# Patient Record
Sex: MALE | Race: BLACK OR AFRICAN AMERICAN | Employment: FULL TIME | URBAN - METROPOLITAN AREA
[De-identification: names, ages, dates, MRNs, and addresses within clinical notes are randomized per-mention and may not be internally consistent; named-entity substitution may affect disease eponyms.]

---

## 2022-03-19 PROBLEM — E29.1 HYPOGONADISM IN MALE: Status: ACTIVE | Noted: 2018-10-18

## 2022-04-01 PROBLEM — Z86.010 H/O ADENOMATOUS POLYP OF COLON: Status: ACTIVE | Noted: 2022-04-01

## 2022-04-01 PROBLEM — D12.6 TUBULOVILLOUS ADENOMA POLYP OF COLON: Status: ACTIVE | Noted: 2022-04-01

## 2022-04-01 PROBLEM — Z86.0101 H/O ADENOMATOUS POLYP OF COLON: Status: ACTIVE | Noted: 2022-04-01

## 2022-05-31 RX ORDER — LOSARTAN POTASSIUM 50 MG/1
TABLET ORAL
Qty: 90 TABLET | Refills: 1 | OUTPATIENT
Start: 2022-05-31

## 2022-06-27 ENCOUNTER — PREP FOR PROCEDURE (OUTPATIENT)
Dept: SURGERY | Age: 57
End: 2022-06-27

## 2022-06-28 RX ORDER — SODIUM CHLORIDE 0.9 % (FLUSH) 0.9 %
5-40 SYRINGE (ML) INJECTION EVERY 12 HOURS SCHEDULED
Status: CANCELLED | OUTPATIENT
Start: 2022-06-28

## 2022-06-28 RX ORDER — SODIUM CHLORIDE 9 MG/ML
INJECTION, SOLUTION INTRAVENOUS PRN
Status: CANCELLED | OUTPATIENT
Start: 2022-06-28

## 2022-06-28 RX ORDER — SODIUM CHLORIDE 0.9 % (FLUSH) 0.9 %
5-40 SYRINGE (ML) INJECTION PRN
Status: CANCELLED | OUTPATIENT
Start: 2022-06-28

## 2022-07-07 DIAGNOSIS — E29.1 HYPOGONADISM IN MALE: Primary | ICD-10-CM

## 2022-07-08 RX ORDER — TESTOSTERONE CYPIONATE 200 MG/ML
INJECTION INTRAMUSCULAR
Qty: 10 ML | Refills: 1 | Status: SHIPPED | OUTPATIENT
Start: 2022-07-08 | End: 2022-07-20 | Stop reason: SDUPTHER

## 2022-07-10 DIAGNOSIS — E29.1 HYPOGONADISM IN MALE: Primary | ICD-10-CM

## 2022-07-10 DIAGNOSIS — F41.1 GENERALIZED ANXIETY DISORDER: ICD-10-CM

## 2022-07-10 DIAGNOSIS — E78.00 PURE HYPERCHOLESTEROLEMIA: ICD-10-CM

## 2022-07-10 DIAGNOSIS — E55.9 VITAMIN D DEFICIENCY: ICD-10-CM

## 2022-07-11 ENCOUNTER — TELEPHONE (OUTPATIENT)
Dept: FAMILY MEDICINE CLINIC | Facility: CLINIC | Age: 57
End: 2022-07-11

## 2022-07-11 DIAGNOSIS — E29.1 HYPOGONADISM IN MALE: ICD-10-CM

## 2022-07-11 NOTE — TELEPHONE ENCOUNTER
testosterone cypionate (DEPOTESTOTERONE CYPIONATE) 200 mg/mL injection 10 mL 1 3/22/2022     Sig: INJECT 1 ML INTO THE MUSCLE EVERY 21 DAYS (MAX DAILY AMOUNT: 1 ML)    Sent to pharmacy as: testosterone cypionate 200 mg/mL intramuscular oil (DEPOTESTOTERONE CYPIONATE)    E-Prescribing Status: Receipt confirmed by pharmacy (3/22/2022 12:10 PM EDT)            Associated Diagnoses    Hypogonadism in male           Pharmacy    [facility] [address] [place], [address] [place] [address]       Encounter

## 2022-07-14 ENCOUNTER — NURSE ONLY (OUTPATIENT)
Dept: FAMILY MEDICINE CLINIC | Facility: CLINIC | Age: 57
End: 2022-07-14

## 2022-07-14 DIAGNOSIS — E55.9 VITAMIN D DEFICIENCY: ICD-10-CM

## 2022-07-14 DIAGNOSIS — F41.1 GENERALIZED ANXIETY DISORDER: ICD-10-CM

## 2022-07-14 DIAGNOSIS — E29.1 HYPOGONADISM IN MALE: ICD-10-CM

## 2022-07-14 DIAGNOSIS — E78.00 PURE HYPERCHOLESTEROLEMIA: ICD-10-CM

## 2022-07-14 LAB
25(OH)D3 SERPL-MCNC: 46.1 NG/ML (ref 30–100)
ALBUMIN SERPL-MCNC: 3.9 G/DL (ref 3.5–5)
ALBUMIN/GLOB SERPL: 1.3 {RATIO} (ref 1.2–3.5)
ALP SERPL-CCNC: 56 U/L (ref 50–136)
ALT SERPL-CCNC: 44 U/L (ref 12–65)
ANION GAP SERPL CALC-SCNC: 4 MMOL/L (ref 7–16)
AST SERPL-CCNC: 21 U/L (ref 15–37)
BILIRUB SERPL-MCNC: 0.5 MG/DL (ref 0.2–1.1)
BUN SERPL-MCNC: 17 MG/DL (ref 6–23)
CALCIUM SERPL-MCNC: 8.5 MG/DL (ref 8.3–10.4)
CHLORIDE SERPL-SCNC: 109 MMOL/L (ref 98–107)
CHOLEST SERPL-MCNC: 184 MG/DL
CO2 SERPL-SCNC: 27 MMOL/L (ref 21–32)
CREAT SERPL-MCNC: 1.1 MG/DL (ref 0.8–1.5)
GLOBULIN SER CALC-MCNC: 3.1 G/DL (ref 2.3–3.5)
GLUCOSE SERPL-MCNC: 80 MG/DL (ref 65–100)
HDLC SERPL-MCNC: 52 MG/DL (ref 40–60)
HDLC SERPL: 3.5 {RATIO}
LDLC SERPL CALC-MCNC: 119.6 MG/DL
POTASSIUM SERPL-SCNC: 4.2 MMOL/L (ref 3.5–5.1)
PROT SERPL-MCNC: 7 G/DL (ref 6.3–8.2)
PSA SERPL-MCNC: 3.1 NG/ML
SODIUM SERPL-SCNC: 140 MMOL/L (ref 138–145)
TESTOST FREE SERPL-MCNC: 6.4 PG/ML (ref 7.2–24)
TRIGL SERPL-MCNC: 62 MG/DL (ref 35–150)
TSH, 3RD GENERATION: 0.98 UIU/ML (ref 0.36–3.74)
VLDLC SERPL CALC-MCNC: 12.4 MG/DL (ref 6–23)

## 2022-07-20 ENCOUNTER — OFFICE VISIT (OUTPATIENT)
Dept: FAMILY MEDICINE CLINIC | Facility: CLINIC | Age: 57
End: 2022-07-20
Payer: COMMERCIAL

## 2022-07-20 VITALS
HEIGHT: 73 IN | DIASTOLIC BLOOD PRESSURE: 86 MMHG | HEART RATE: 87 BPM | OXYGEN SATURATION: 99 % | WEIGHT: 183 LBS | BODY MASS INDEX: 24.25 KG/M2 | SYSTOLIC BLOOD PRESSURE: 126 MMHG

## 2022-07-20 DIAGNOSIS — E55.9 VITAMIN D DEFICIENCY: ICD-10-CM

## 2022-07-20 DIAGNOSIS — E29.1 HYPOGONADISM IN MALE: ICD-10-CM

## 2022-07-20 DIAGNOSIS — Z00.00 LABORATORY EXAMINATION ORDERED AS PART OF A COMPLETE PHYSICAL EXAMINATION: ICD-10-CM

## 2022-07-20 DIAGNOSIS — Z11.59 SPECIAL SCREENING EXAMINATION FOR VIRAL DISEASE: ICD-10-CM

## 2022-07-20 DIAGNOSIS — I10 ESSENTIAL HYPERTENSION: Primary | ICD-10-CM

## 2022-07-20 LAB
BILIRUBIN, URINE, POC: NEGATIVE
BLOOD URINE, POC: NEGATIVE
GLUCOSE URINE, POC: NEGATIVE
KETONES, URINE, POC: ABNORMAL
LEUKOCYTE ESTERASE, URINE, POC: NEGATIVE
NITRITE, URINE, POC: NEGATIVE
PH, URINE, POC: 6 (ref 4.6–8)
PROTEIN,URINE, POC: NEGATIVE
SPECIFIC GRAVITY, URINE, POC: 1.03 (ref 1–1.03)
URINALYSIS CLARITY, POC: CLEAR
URINALYSIS COLOR, POC: YELLOW
UROBILINOGEN, POC: NORMAL

## 2022-07-20 PROCEDURE — 99214 OFFICE O/P EST MOD 30 MIN: CPT | Performed by: NURSE PRACTITIONER

## 2022-07-20 PROCEDURE — 81003 URINALYSIS AUTO W/O SCOPE: CPT | Performed by: NURSE PRACTITIONER

## 2022-07-20 RX ORDER — ERGOCALCIFEROL (VITAMIN D2) 1250 MCG
50000 CAPSULE ORAL
Qty: 8 CAPSULE | Refills: 3 | Status: SHIPPED | OUTPATIENT
Start: 2022-07-20

## 2022-07-20 RX ORDER — TESTOSTERONE CYPIONATE 200 MG/ML
INJECTION INTRAMUSCULAR
Qty: 10 ML | Refills: 1 | Status: SHIPPED | OUTPATIENT
Start: 2022-07-20 | End: 2023-01-16

## 2022-07-20 RX ORDER — LOSARTAN POTASSIUM 50 MG/1
50 TABLET ORAL DAILY
Qty: 90 TABLET | Refills: 3 | Status: SHIPPED | OUTPATIENT
Start: 2022-07-20

## 2022-07-20 RX ORDER — ATENOLOL 50 MG/1
50 TABLET ORAL DAILY
Qty: 90 TABLET | Refills: 3 | Status: SHIPPED | OUTPATIENT
Start: 2022-07-20

## 2022-07-20 ASSESSMENT — ENCOUNTER SYMPTOMS
STRIDOR: 0
SHORTNESS OF BREATH: 0
CHOKING: 0
WHEEZING: 0
VOICE CHANGE: 0
ANAL BLEEDING: 0
CHEST TIGHTNESS: 0
ABDOMINAL DISTENTION: 0
BACK PAIN: 0
RESPIRATORY NEGATIVE: 1
EYES NEGATIVE: 1
EYE REDNESS: 0
COLOR CHANGE: 0
SORE THROAT: 0
EYE ITCHING: 0
APNEA: 0
NAUSEA: 0
CONSTIPATION: 0
RHINORRHEA: 0
RECTAL PAIN: 0
ALLERGIC/IMMUNOLOGIC NEGATIVE: 1
COUGH: 0
GASTROINTESTINAL NEGATIVE: 1
SINUS PAIN: 0
SINUS PRESSURE: 0
FACIAL SWELLING: 0
EYE PAIN: 0
BLOOD IN STOOL: 0
VOMITING: 0
EYE DISCHARGE: 0
DIARRHEA: 0
TROUBLE SWALLOWING: 0
PHOTOPHOBIA: 0
ABDOMINAL PAIN: 0

## 2022-07-20 ASSESSMENT — PATIENT HEALTH QUESTIONNAIRE - PHQ9
10. IF YOU CHECKED OFF ANY PROBLEMS, HOW DIFFICULT HAVE THESE PROBLEMS MADE IT FOR YOU TO DO YOUR WORK, TAKE CARE OF THINGS AT HOME, OR GET ALONG WITH OTHER PEOPLE: 0
5. POOR APPETITE OR OVEREATING: 0
SUM OF ALL RESPONSES TO PHQ QUESTIONS 1-9: 0
1. LITTLE INTEREST OR PLEASURE IN DOING THINGS: 0
7. TROUBLE CONCENTRATING ON THINGS, SUCH AS READING THE NEWSPAPER OR WATCHING TELEVISION: 0
4. FEELING TIRED OR HAVING LITTLE ENERGY: 0
3. TROUBLE FALLING OR STAYING ASLEEP: 0
8. MOVING OR SPEAKING SO SLOWLY THAT OTHER PEOPLE COULD HAVE NOTICED. OR THE OPPOSITE, BEING SO FIGETY OR RESTLESS THAT YOU HAVE BEEN MOVING AROUND A LOT MORE THAN USUAL: 0
2. FEELING DOWN, DEPRESSED OR HOPELESS: 0
SUM OF ALL RESPONSES TO PHQ QUESTIONS 1-9: 0
SUM OF ALL RESPONSES TO PHQ QUESTIONS 1-9: 0
9. THOUGHTS THAT YOU WOULD BE BETTER OFF DEAD, OR OF HURTING YOURSELF: 0
6. FEELING BAD ABOUT YOURSELF - OR THAT YOU ARE A FAILURE OR HAVE LET YOURSELF OR YOUR FAMILY DOWN: 0
SUM OF ALL RESPONSES TO PHQ9 QUESTIONS 1 & 2: 0
SUM OF ALL RESPONSES TO PHQ QUESTIONS 1-9: 0

## 2022-07-20 ASSESSMENT — ANXIETY QUESTIONNAIRES
2. NOT BEING ABLE TO STOP OR CONTROL WORRYING: 0
GAD7 TOTAL SCORE: 1
5. BEING SO RESTLESS THAT IT IS HARD TO SIT STILL: 0
3. WORRYING TOO MUCH ABOUT DIFFERENT THINGS: 0
1. FEELING NERVOUS, ANXIOUS, OR ON EDGE: 0
IF YOU CHECKED OFF ANY PROBLEMS ON THIS QUESTIONNAIRE, HOW DIFFICULT HAVE THESE PROBLEMS MADE IT FOR YOU TO DO YOUR WORK, TAKE CARE OF THINGS AT HOME, OR GET ALONG WITH OTHER PEOPLE: NOT DIFFICULT AT ALL
7. FEELING AFRAID AS IF SOMETHING AWFUL MIGHT HAPPEN: 0
4. TROUBLE RELAXING: 1
6. BECOMING EASILY ANNOYED OR IRRITABLE: 0

## 2022-07-20 NOTE — PROGRESS NOTES
medications for this visit. Allergies   Allergen Reactions    Tramadol Other (See Comments)     Patient states not allergic    Hydrocodone-Acetaminophen Nausea And Vomiting     Patient Active Problem List   Diagnosis    Hypogonadism in male    H/O adenomatous polyp of colon    Tubulovillous adenoma polyp of colon     Past Medical History:   Diagnosis Date    Chronic pain 2014    back pain and right buttocks, leg , foot    HNP (herniated nucleus pulposus), lumbar     Hypertension      Past Surgical History:   Procedure Laterality Date    COLONOSCOPY N/A 10/13/2016    COLONOSCOPY/ 27 performed by Zaria Whitten MD at [facility] ENDOSCOPY    COLONOSCOPY,[place] ECHEVERRIA/CAUTERY  10/13/2016         LUMBAR LAMINECTOMY      x 2     Family History   Problem Relation Age of Onset    Cancer Mother     Cancer Sister     Cancer Father     Hypertension Mother      Social History     Tobacco Use    Smoking status: Former     Packs/day: 0.10     Types: Cigarettes     Quit date: 2004     Years since quittin.5    Smokeless tobacco: Never   Substance Use Topics    Alcohol use: Yes         REVIEW OF SYSTEM    Review of Systems   Constitutional: Negative. Negative for activity change, appetite change, chills, diaphoresis, fatigue, fever and unexpected weight change. HENT: Negative. Negative for congestion, dental problem, drooling, ear discharge, ear pain, facial swelling, hearing loss, mouth sores, nosebleeds, postnasal drip, rhinorrhea, sinus pressure, sinus pain, sneezing, sore throat, tinnitus, trouble swallowing and voice change. Eyes: Negative. Negative for photophobia, pain, discharge, redness, itching and visual disturbance. Respiratory: Negative. Negative for apnea, cough, choking, chest tightness, shortness of breath, wheezing and stridor. Cardiovascular: Negative. Negative for chest pain, palpitations and leg swelling. Gastrointestinal: Negative.   Negative for abdominal distention, abdominal pain, anal bleeding, blood in stool, constipation, diarrhea, nausea, rectal pain and vomiting. Endocrine: Negative. Negative for cold intolerance, heat intolerance, polydipsia, polyphagia and polyuria. Genitourinary: Negative. Negative for decreased urine volume, difficulty urinating, dysuria, enuresis, flank pain, frequency, genital sores, hematuria and urgency. Musculoskeletal: Negative. Negative for arthralgias, back pain, gait problem, joint swelling, myalgias, neck pain and neck stiffness. Skin: Negative. Negative for color change, pallor, rash and wound. Allergic/Immunologic: Negative. Negative for environmental allergies, food allergies and immunocompromised state. Neurological: Negative. Negative for dizziness, tremors, seizures, syncope, facial asymmetry, speech difficulty, weakness, light-headedness, numbness and headaches. Hematological: Negative. Negative for adenopathy. Does not bruise/bleed easily. Psychiatric/Behavioral: Negative. Negative for agitation, behavioral problems, confusion, decreased concentration, dysphoric mood, hallucinations, self-injury, sleep disturbance and suicidal ideas. The patient is not nervous/anxious and is not hyperactive. OBJECTIVE:  /86 (Site: Left Upper Arm, Position: Sitting)   Pulse 87   Ht 6' 1\" (1.854 m)   Wt 183 lb (83 kg)   SpO2 99%   BMI 24.14 kg/m²      Physical Exam  Constitutional:       General: He is not in acute distress. Appearance: Normal appearance. He is not ill-appearing, toxic-appearing or diaphoretic. HENT:      Head: Normocephalic and atraumatic. Right Ear: Tympanic membrane, ear canal and external ear normal. There is no impacted cerumen. Left Ear: Tympanic membrane, ear canal and external ear normal. There is no impacted cerumen. Nose: Nose normal.      Mouth/Throat:      Pharynx: Oropharynx is clear. Eyes:      Extraocular Movements: Extraocular movements intact.       Conjunctiva/sclera: Conjunctivae normal.      Pupils: Pupils are equal, round, and reactive to light. Neck:      Vascular: No carotid bruit. Cardiovascular:      Rate and Rhythm: Normal rate and regular rhythm. Pulses: Normal pulses. Heart sounds: Normal heart sounds. Pulmonary:      Effort: Pulmonary effort is normal.      Breath sounds: Normal breath sounds. Abdominal:      General: Abdomen is flat. Bowel sounds are normal.      Palpations: Abdomen is soft. Musculoskeletal:      Cervical back: Normal range of motion and neck supple. No rigidity or tenderness. Lymphadenopathy:      Cervical: No cervical adenopathy. Skin:     General: Skin is warm and dry. Neurological:      General: No focal deficit present. Mental Status: He is alert and oriented to person, place, and time. Psychiatric:         Mood and Affect: Mood normal.         Behavior: Behavior normal.         Thought Content: Thought content normal.         Judgment: Judgment normal.         Medical problems and test results were reviewed with the patient today. Lab Results   Component Value Date     07/14/2022    K 4.2 07/14/2022     (H) 07/14/2022    CO2 27 07/14/2022    BUN 17 07/14/2022    CREATININE 1.10 07/14/2022    GLUCOSE 80 07/14/2022    CALCIUM 8.5 07/14/2022    PROT 7.0 07/14/2022    LABALBU 3.9 07/14/2022    BILITOT 0.5 07/14/2022    ALKPHOS 56 07/14/2022    AST 21 07/14/2022    ALT 44 07/14/2022    LABGLOM >60 07/14/2022    GFRAA >60 07/14/2022    AGRATIO 1.8 12/08/2021    GLOB 3.1 07/14/2022           Lab Results   Component Value Date/Time    VITD25 46.1 07/14/2022 09:54 AM      Lab Results   Component Value Date    PSA 3.1 07/14/2022    PSA 2.7 12/08/2021    PSA 2.6 07/27/2021         ASSESSMENT and PLAN    1. Essential hypertension  -     AMB POC URINALYSIS DIP STICK AUTO W/O MICRO  -     atenolol (TENORMIN) 50 MG tablet; Take 1 tablet by mouth in the morning.  For blood pressure., Disp-90 tablet, R-3Normal  - losartan (COZAAR) 50 MG tablet; Take 1 tablet by mouth in the morning. For blood pressure., Disp-90 tablet, R-3Normal    Blood pressure is stable today of 126/86. We will have patient continue with atenolol 50 mg once daily along with losartan 50 mg once daily. We will have patient continue with current therapy. 2. Hypogonadism in male  -     testosterone cypionate (DEPOTESTOTERONE CYPIONATE) 200 MG/ML injection; INJECT 1ML INTRAMUSCULARLY EVERY 21 DAYS, Disp-10 mL, R-1Normal  -     PSA Screening; Future    Stable. Patient reports no sign or symptom of hematuria, urinary urgency or frequency or hesitancy. We will have patient continue current therapy. 3. Vitamin D deficiency  -     ergocalciferol (ERGOCALCIFEROL) 1.25 MG (52851 UT) capsule; Take 1 capsule by mouth every 14 days With food for vitamin D supplementation, Disp-8 capsule, R-3Normal  -     Vitamin D 25 Hydroxy; Future    Vitamin D level now normal.  We will decrease ergocalciferol to 50,000 international units once every 14 days for maintenance therapy. We will repeat level in 6 months. 4. Special screening examination for viral disease  -     Hepatitis C Antibody; Future  -     HIV 1/2 Ag/Ab, 4TH Generation,W Rflx Confirm; Future    Per current guideline recommendation for screening, testing ordered for next physical labs. 5. Laboratory examination ordered as part of a complete physical examination  -     CBC with Auto Differential; Future  -     Lipid Panel; Future  -     Comprehensive Metabolic Panel; Future  -     TSH; Future  -     PSA Screening;  Future    Orders Placed This Encounter   Procedures    CBC with Auto Differential     Standing Status:   Future     Standing Expiration Date:   1/20/2024    Lipid Panel     Standing Status:   Future     Standing Expiration Date:   7/20/2023    Comprehensive Metabolic Panel     Standing Status:   Future     Standing Expiration Date:   1/20/2024    Vitamin D 25 Hydroxy     Standing Status: Future     Standing Expiration Date:   7/20/2023    TSH     Standing Status:   Future     Standing Expiration Date:   7/20/2023    Hepatitis C Antibody     Standing Status:   Future     Standing Expiration Date:   7/20/2023    PSA Screening     Standing Status:   Future     Standing Expiration Date:   7/20/2023    HIV 1/2 Ag/Ab, 4TH Generation,W Rflx Confirm     Standing Status:   Future     Standing Expiration Date:   7/20/2023    AMB POC URINALYSIS DIP STICK AUTO W/O MICRO           Elements of this note have been dictated using speech recognition software. As a result, errors of speech recognition may have occurred. On this date 7/20/2022 I have spent 30 minutes reviewing previous notes, test results and face to face with the patient discussing the diagnosis and importance of compliance with the treatment plan as well as documenting on the day of the visit. Greater than 50% of this visit was spent counseling the patient about test results, prognosis, importance of compliance, education about disease process, benefits of medications, instructions for management of acute symptoms, and follow up plans. Return in about 6 months (around 1/20/2023) for Physical with fasting labs prior.      Simran Duarte, GILL - CNP

## 2022-07-26 NOTE — PERIOP NOTE
Patient verified name, , and procedure. Type: 1a; abbreviated assessment per anesthesia guidelines    Labs per anesthesia: none    Instructed pt that they will be notified the day before their procedure by the GI Lab for time of arrival if their procedure is [address] and Pre-op for [place] cases. Arrival times should be called by 5 pm. If no phone is received the patient should contact their respective hospital. The GI lab telephone number is [phone] and ES Pre-op is [phone]. Follow diet and prep instructions per office including NPO status. If patient has NOT received instructions from office patient is advised to call surgeon office, verbalizes understanding. Bath or shower the night before and the am of surgery with non-mositurizing soap. No lotions, oils, powders, cologne on skin. No make up, eye make up or jewelry. Wear loose fitting comfortable, clean clothing. Must have adult present in building the entire time . Medications for the day of procedure atenolol. May take hydroxyzine if needed. patient to hold Naproxen (NSAIDS), Vitamins and supplements. DO not take losartan on morning ofprocedure    The following discharge instructions reviewed with patient: medication given during procedure may cause drowsiness for several hours, therefore, do not drive or operate machinery for remainder of the day. You may not drink alcohol on the day of your procedure, please resume regular diet and activity unless otherwise directed. You may experience abdominal distention for several hours that is relieved by the passage of gas. Contact your physician if you have any of the following: fever or chills, severe abdominal pain or excessive amount of bleeding or a large amount when having a bowel movement.  Occasional specks of blood with bowel movement would not be unusual.

## 2022-07-27 NOTE — PROGRESS NOTES
Spoke with pt regarding tomorrow's procedure.  Pt verbalized understanding of early arrival time of 0645 as well as  policy

## 2022-07-28 ENCOUNTER — ANESTHESIA EVENT (OUTPATIENT)
Dept: ENDOSCOPY | Age: 57
End: 2022-07-28
Payer: COMMERCIAL

## 2022-07-28 ENCOUNTER — ANESTHESIA (OUTPATIENT)
Dept: ENDOSCOPY | Age: 57
End: 2022-07-28
Payer: COMMERCIAL

## 2022-07-28 ENCOUNTER — HOSPITAL ENCOUNTER (OUTPATIENT)
Age: 57
Setting detail: OUTPATIENT SURGERY
Discharge: HOME | End: 2022-07-28
Attending: SURGERY | Admitting: SURGERY
Payer: COMMERCIAL

## 2022-07-28 VITALS
BODY MASS INDEX: 25.06 KG/M2 | RESPIRATION RATE: 18 BRPM | OXYGEN SATURATION: 99 % | DIASTOLIC BLOOD PRESSURE: 82 MMHG | HEART RATE: 59 BPM | SYSTOLIC BLOOD PRESSURE: 138 MMHG | TEMPERATURE: 97.7 F | HEIGHT: 72 IN | WEIGHT: 185 LBS

## 2022-07-28 PROCEDURE — 2580000003 HC RX 258: Performed by: REGISTERED NURSE

## 2022-07-28 PROCEDURE — 3609027000 HC COLONOSCOPY: Performed by: SURGERY

## 2022-07-28 PROCEDURE — 3700000000 HC ANESTHESIA ATTENDED CARE: Performed by: SURGERY

## 2022-07-28 PROCEDURE — 7100000011 HC PHASE II RECOVERY - ADDTL 15 MIN: Performed by: SURGERY

## 2022-07-28 PROCEDURE — 3700000001 HC ADD 15 MINUTES (ANESTHESIA): Performed by: SURGERY

## 2022-07-28 PROCEDURE — 6360000002 HC RX W HCPCS: Performed by: REGISTERED NURSE

## 2022-07-28 PROCEDURE — 2500000003 HC RX 250 WO HCPCS: Performed by: REGISTERED NURSE

## 2022-07-28 PROCEDURE — 7100000010 HC PHASE II RECOVERY - FIRST 15 MIN: Performed by: SURGERY

## 2022-07-28 PROCEDURE — 45378 DIAGNOSTIC COLONOSCOPY: CPT | Performed by: SURGERY

## 2022-07-28 PROCEDURE — 2709999900 HC NON-CHARGEABLE SUPPLY: Performed by: SURGERY

## 2022-07-28 RX ORDER — SODIUM CHLORIDE, SODIUM LACTATE, POTASSIUM CHLORIDE, CALCIUM CHLORIDE 600; 310; 30; 20 MG/100ML; MG/100ML; MG/100ML; MG/100ML
INJECTION, SOLUTION INTRAVENOUS CONTINUOUS
Status: CANCELLED | OUTPATIENT
Start: 2022-07-28

## 2022-07-28 RX ORDER — SODIUM CHLORIDE 0.9 % (FLUSH) 0.9 %
5-40 SYRINGE (ML) INJECTION EVERY 12 HOURS SCHEDULED
Status: CANCELLED | OUTPATIENT
Start: 2022-07-28

## 2022-07-28 RX ORDER — LIDOCAINE HYDROCHLORIDE 10 MG/ML
1 INJECTION, SOLUTION INFILTRATION; PERINEURAL
Status: CANCELLED | OUTPATIENT
Start: 2022-07-28 | End: 2022-07-28

## 2022-07-28 RX ORDER — ONDANSETRON 2 MG/ML
4 INJECTION INTRAMUSCULAR; INTRAVENOUS
Status: CANCELLED | OUTPATIENT
Start: 2022-07-28 | End: 2022-07-28

## 2022-07-28 RX ORDER — SODIUM CHLORIDE, SODIUM LACTATE, POTASSIUM CHLORIDE, CALCIUM CHLORIDE 600; 310; 30; 20 MG/100ML; MG/100ML; MG/100ML; MG/100ML
INJECTION, SOLUTION INTRAVENOUS CONTINUOUS PRN
Status: DISCONTINUED | OUTPATIENT
Start: 2022-07-28 | End: 2022-07-28 | Stop reason: SDUPTHER

## 2022-07-28 RX ORDER — PROPOFOL 10 MG/ML
INJECTION, EMULSION INTRAVENOUS PRN
Status: DISCONTINUED | OUTPATIENT
Start: 2022-07-28 | End: 2022-07-28 | Stop reason: SDUPTHER

## 2022-07-28 RX ORDER — PROPOFOL 10 MG/ML
INJECTION, EMULSION INTRAVENOUS CONTINUOUS PRN
Status: DISCONTINUED | OUTPATIENT
Start: 2022-07-28 | End: 2022-07-28 | Stop reason: SDUPTHER

## 2022-07-28 RX ORDER — SODIUM CHLORIDE 0.9 % (FLUSH) 0.9 %
5-40 SYRINGE (ML) INJECTION PRN
Status: CANCELLED | OUTPATIENT
Start: 2022-07-28

## 2022-07-28 RX ORDER — LIDOCAINE HYDROCHLORIDE 20 MG/ML
INJECTION, SOLUTION EPIDURAL; INFILTRATION; INTRACAUDAL; PERINEURAL PRN
Status: DISCONTINUED | OUTPATIENT
Start: 2022-07-28 | End: 2022-07-28 | Stop reason: SDUPTHER

## 2022-07-28 RX ORDER — SODIUM CHLORIDE 9 MG/ML
INJECTION, SOLUTION INTRAVENOUS PRN
Status: CANCELLED | OUTPATIENT
Start: 2022-07-28

## 2022-07-28 RX ADMIN — PROPOFOL 200 MCG/KG/MIN: 10 INJECTION, EMULSION INTRAVENOUS at 08:44

## 2022-07-28 RX ADMIN — LIDOCAINE HYDROCHLORIDE 40 MG: 20 INJECTION, SOLUTION EPIDURAL; INFILTRATION; INTRACAUDAL; PERINEURAL at 08:42

## 2022-07-28 RX ADMIN — PROPOFOL 50 MG: 10 INJECTION, EMULSION INTRAVENOUS at 08:42

## 2022-07-28 RX ADMIN — PROPOFOL 30 MG: 10 INJECTION, EMULSION INTRAVENOUS at 08:44

## 2022-07-28 RX ADMIN — SODIUM CHLORIDE, SODIUM LACTATE, POTASSIUM CHLORIDE, AND CALCIUM CHLORIDE: 600; 310; 30; 20 INJECTION, SOLUTION INTRAVENOUS at 08:38

## 2022-07-28 RX ADMIN — PROPOFOL 40 MG: 10 INJECTION, EMULSION INTRAVENOUS at 08:50

## 2022-07-28 ASSESSMENT — PAIN - FUNCTIONAL ASSESSMENT: PAIN_FUNCTIONAL_ASSESSMENT: NONE - DENIES PAIN

## 2022-07-28 NOTE — OP NOTE
Operative Note      Patient: Dickey Schlatter  YOB: 1965  MRN: [id number]    Colonoscopy Procedure Note  Date of Procedure: 7/28/2022    Pre-Op Diagnosis: h/o TVA of R colon 2016    Post-Op Diagnosis: Same and normal colonoscopy to terminal ileum       Procedure(s):  COLORECTAL CANCER SCREENING, HIGH RISK    Surgeon(s):  Latonya Russell MD    Assistant:   * No surgical staff found *    Anesthesia: Monitor Anesthesia Care    Estimated Blood Loss (mL): Minimal    Complications: None    Specimens:   * No specimens in log *    Detailed Description of Procedure:   Informed consent was obtained for the procedure, including sedation. Risks of perforation, hemorrhage, adverse drug reaction and aspiration were discussed. The patient was placed in the left lateral decubitus position. Based on the pre-procedure assessment, including review of the patient's medical history, medications, allergies, and review of systems, he had been deemed to be an appropriate candidate for sedation by the Anesthesia Dept. The patient was monitored continuously with ECG tracing, pulse oximetry, blood pressure monitoring, and direct observations. A time out was performed. Once sedation was adequate, a rectal examination was performed. The [id number] was inserted into the rectum and advanced under direct vision to the terminal ileum. The quality of the colonic preparation was good. A careful inspection was made as the colonoscope was withdrawn, including a retroflexed view of the rectum; findings and interventions are described below. Appropriate photodocumentation was obtained. Findings: ANUS: Anal exam reveals no masses or hemorrhoids, sphincter tone is normal.   RECTUM: Rectal exam reveals no masses or hemorrhoids. SIGMOID COLON: The mucosa is normal with good vascular pattern and without ulcers, diverticula, and polyps.    DESCENDING COLON: The mucosa is normal with good vascular pattern and without ulcers, diverticula, and polyps. TRANSVERSE COLON: The mucosa is normal with good vascular pattern and without ulcers, diverticula, and polyps. ASCENDING COLON: The mucosa is normal with good vascular pattern and without ulcers, diverticula, and polyps. CECUM: The appendiceal orifice appears normal. The ileocecal valve appears normal.   TERMINAL ILEUM: The terminal ileum was normal.            Attending Attestation: I performed the procedure. Impression:  Normal colonoscopy to the terminal ileum, with no evidence of neoplasia, diverticular disease, or mucosal abnormality.     Recommendations:-Follow-up with primary care physician on normal schedule and -Repeat colonoscopy in 5 years(2027) due to h/o TVA polyp 2016    Electronically signed by Latha Sow MD on 7/28/2022 at 9:41 AM

## 2022-07-28 NOTE — DISCHARGE INSTRUCTIONS
Gastrointestinal Colonoscopy/Flexible Sigmoidoscopy - Lower Exam Discharge Instructions    Call Dr. Deonte Mendoza at [phone] for any problems or questions. Contact the doctors office for follow up appointment as directed. Medication may cause drowsiness for several hours, therefore:  Do not drive or operate machinery for reminder of the day. No alcohol today. Do not make any important or legal decisions for 24 hours. Do not sign any legal documents for 24 hours. Ordinarily, you may resume regular diet and activity after exam unless otherwise specified by your physician. Because of air put into your colon during exam, you may experience some abdominal distension, relieved by the passage of gas, for several hours. Contact your physician if you have any of the following:  Excessive amount of bleeding - large amount when having a bowel movement. Occasional specks of blood with bowel movement would not be unusual.  Severe abdominal pain  Fever or Chills    Polyp Removal - follow these additional instructions   Take Metamucil - 1 tablespoon in juice every morning for 3 days, as needed for constipation.       Any additional instructions:    Office will call to make any future appointments

## 2022-07-28 NOTE — H&P
[facility]  [address] [place] [address]  Kelsea Ruiz, [address] [facility]  [phone]    H&P Note   Sirena Benitez   MRN: [id number]     : 1965        HPI: Sirena Benitez is a 60 y.o. male who is referred by Chung King for colonoscopy. I performed his initial colonoscopy in  and removed a tubulovillous adenoma from the ascending colon. A 3-year surveillance interval would have been recommended, but he did not keep his follow-up appointment. He was set up for a 5-year interval recommendation. He is slightly off schedule, he has had no GI complaints. In 2016, he was referred by Dr. Sharol Lombard for initial screening colonoscopy. He has no family history of colon cancer or other colon diseases. He has normal daily bowel movements. He denies seeing any blood or mucus per rectum or any change in the caliber of stools. He has no past surgical history of abdominal surgery. He has had back surgery and had no issues with anesthesia. Past Medical History:   Diagnosis Date    Chronic pain 2014    back pain and right buttocks, leg , foot    HNP (herniated nucleus pulposus), lumbar     Hypertension      Past Surgical History:   Procedure Laterality Date    COLONOSCOPY N/A 10/13/2016    COLONOSCOPY/  performed by Matilde Diaz MD at [facility] ENDOSCOPY    COLONOSCOPY,[place] ECHEVERRIA/CAUTERY  10/13/2016         HX LUMBAR LAMINECTOMY      x 2     Current Outpatient Medications   Medication Sig    testosterone cypionate (DEPOTESTOTERONE CYPIONATE) 200 mg/mL injection INJECT 1 ML INTO THE MUSCLE EVERY 21 DAYS (MAX DAILY AMOUNT: 1 ML)    atenoloL (TENORMIN) 50 mg tablet Take 1 Tablet by mouth daily.  For blood pressure    cyclobenzaprine (FLEXERIL) 10 mg tablet TAKE ONE-HALF TO ONE TABLET BY MOUTH EVERY EVENING AS NEEDED FOR MUSCLE SPASM    hydrOXYzine HCL (ATARAX) 25 mg tablet Take 1/2 to 1 tablet orally up to twice daily as needed for anxiety    losartan (COZAAR) 50 mg tablet Take 1 Tablet by mouth daily. For blood pressure    ergocalciferol (ERGOCALCIFEROL) 1,250 mcg (50,000 unit) capsule Take 1 Capsule by mouth every seven (7) days. With food for vitamin D replacement therapy    sildenafil citrate (Viagra) 100 mg tablet Take 1 Tablet by mouth as needed for Other (ed). Syringe with Needle, Disp, 3 mL 23 gauge x 1 1/2\" syrg USE TO INJECT TESTOSTERONE EVERY 2 WEEKS    Needle, Disp, 18 G 18 gauge x 1 1/2\" ndle USE TO DRAW TESTOSTERONE EVERY 2 WEEKS    naproxen sodium (ALEVE) 220 mg cap Take  by mouth. No current facility-administered medications for this visit. ALLERGIES:  Lortab [hydrocodone-acetaminophen] and Tramadol    Social History     Socioeconomic History    Marital status:    Tobacco Use    Smoking status: Former Smoker     Packs/day: 0.10     Years: 5.00     Pack years: 0.50     Quit date: 2004     Years since quittin.2    Smokeless tobacco: Never Used   Substance and Sexual Activity    Alcohol use: Yes     Comment: occassionally    Drug use: No     Social History     Tobacco Use   Smoking Status Former Smoker    Packs/day: 0.10    Years: 5.00    Pack years: 0.50    Quit date: 2004    Years since quittin.2   Smokeless Tobacco Never Used     Family History   Problem Relation Age of Onset    Cancer Mother     Hypertension Mother     Cancer Father     Cancer Sister        ROS: The patient has no difficulty with chest pain or shortness of breath. No fever or chills. The patient denies any personal or family history of abnormal clotting or bleeding. Comprehensive review of systems was otherwise unremarkable except as noted above. Physical Exam:   Constitutional: Alert oriented cooperative patient in no acute distress. Visit Vitals  BP (!) 160/91   Pulse 82   Temp 98.5 °F (36.9 °C)   Ht 6' 1\" (1.854 m)   Wt 186 lb 3.2 oz (84.5 kg)   SpO2 99%   BMI 24.57 kg/m²     Eyes:Sclera are clear without icterus. ENMT: no obvious neck masses, no ear or lip lesions  CV: RRR. Normal perfusion  Resp: No JVD. Breathing is  non-labored. GI: soft and non-distended     Musculoskeletal: unremarkable with normal function. Neuro:  No obvious focal deficits  Psychiatric: normal affect and mood, no memory impairment    Lab Results   Component Value Date/Time    HGB 15.5 02/09/2015 12:53 PM       Lab Results   Component Value Date/Time    Sodium 141 12/08/2021 08:57 AM    Potassium 4.1 12/08/2021 08:57 AM    Chloride 104 12/08/2021 08:57 AM    CO2 25 12/08/2021 08:57 AM    BUN 21 12/08/2021 08:57 AM    Creatinine 1.29 (H) 12/08/2021 08:57 AM    Glucose 86 12/08/2021 08:57 AM    Bilirubin, total 0.2 12/08/2021 08:57 AM    Alk. phosphatase 66 12/08/2021 08:57 AM       Assessment/Plan:     Jyothi Angulo is a 60 y.o. male who presents for his interval surveillance colonoscopy. He had a tubulovillous adenoma of the ascending colon removed at his initial colonoscopy in October 2016. He unfortunately did not keep his follow-up appointment where he would have been set up for 3-year surveillance interval.  He was classified as a 5-year surveillance and is not far off from that date. He did excellent with a 1 day prep last time. He is appropriate for the adult colonoscope. We discussed proceeding with colonoscopy. I discussed the patient's condition and treatment options with the patient. I discussed risks of colonoscopy in language the patient could understand including bleeding, infection, aspiration, perforation, medication reaction, need for further endoscopy or surgery, abscess, fistula, SBO, DVT, PE, heart attack, stroke, renal failure, respiratory failure, ventilatory dependence, and death. The patient voiced understanding of all this and all questions were answered. Alternatives to colonoscopy were discussed also and risks of the alternatives. The patient requested that we proceed with colonoscopy. Informed consent was obtained.      Problem List  Date Reviewed: 4/1/2022            Codes Class Noted    H/O adenomatous polyp of colon ICD-10-CM: Z86.010  ICD-9-CM: V12.72  4/1/2022        Hypogonadism in male ICD-10-CM: E29.1  ICD-9-CM: 257.2  10/18/2018        Tubulovillous adenoma polyp of colon ICD-10-CM: D12.6  ICD-9-CM: 211.3  10/13/2016                    Del Chris MD,  FACS

## 2022-07-28 NOTE — ANESTHESIA PRE PROCEDURE
Department of Anesthesiology  Preprocedure Note       Name:  Rojelio Erickson   Age:  59 y.o.  :  1965                                          MRN:  [id number]         Date:  2022      Surgeon: Roya Ken):  Cherie Jacob MD    Procedure: Procedure(s):  COLORECTAL CANCER SCREENING, NOT HIGH RISK **recovery bay 4**    Medications prior to admission:   Prior to Admission medications    Medication Sig Start Date End Date Taking? Authorizing Provider   testosterone cypionate (DEPOTESTOTERONE CYPIONATE) 200 MG/ML injection INJECT 1ML INTRAMUSCULARLY EVERY 21 DAYS 22  GILL Morrow - CNP   atenolol (TENORMIN) 50 MG tablet Take 1 tablet by mouth in the morning. For blood pressure. 22   GILL Resendez - CNP   losartan (COZAAR) 50 MG tablet Take 1 tablet by mouth in the morning. For blood pressure. 22   GILL Resendez - CNP   ergocalciferol (ERGOCALCIFEROL) 1.25 MG (30422 UT) capsule Take 1 capsule by mouth every 14 days With food for vitamin D supplementation 22   GILL Morrow CNP   cyclobenzaprine (FLEXERIL) 10 MG tablet TAKE ONE-HALF TO ONE TABLET BY MOUTH EVERY EVENING AS NEEDED FOR MUSCLE SPASM 22   Ar Automatic Reconciliation   hydrOXYzine (ATARAX) 25 MG tablet Take 1/2 to 1 tablet orally up to twice daily as needed for anxiety 22   Ar Automatic Reconciliation   Naproxen Sodium 220 MG CAPS Take by mouth    Ar Automatic Reconciliation   sildenafil (VIAGRA) 100 MG tablet Take 100 mg by mouth as needed 8/3/21   Ar Automatic Reconciliation       Current medications:    No current facility-administered medications for this encounter. Allergies:     Allergies   Allergen Reactions    Tramadol Other (See Comments)     Patient states not allergic    Hydrocodone-Acetaminophen Nausea And Vomiting       Problem List:    Patient Active Problem List   Diagnosis Code    Hypogonadism in male E29.1    H/O adenomatous polyp of colon Z86.010    Tubulovillous adenoma polyp of colon D12.6       Past Medical History:        Diagnosis Date    Chronic pain 2014    back pain and right buttocks, leg , foot    HNP (herniated nucleus pulposus), lumbar     Hypertension        Past Surgical History:        Procedure Laterality Date    COLONOSCOPY N/A 10/13/2016    COLONOSCOPY/  performed by Saadia Loo MD at [facility],FORCEP/CAUTERY  10/13/2016         LUMBAR LAMINECTOMY      x 2       Social History:    Social History     Tobacco Use    Smoking status: Former     Packs/day: 0.10     Types: Cigarettes     Quit date: 2004     Years since quittin.5    Smokeless tobacco: Never   Substance Use Topics    Alcohol use: Not Currently                                Counseling given: Not Answered      Vital Signs (Current):   Vitals:    22 1112 22 0715   BP:  (!) 185/83   Pulse:  57   Resp:  18   Temp:  97.5 °F (36.4 °C)   TempSrc:  Oral   SpO2:  99%   Weight: 185 lb (83.9 kg) 185 lb (83.9 kg)   Height: 6' (1.829 m) 6' (1.829 m)                                              BP Readings from Last 3 Encounters:   22 (!) 185/83   22 126/86   22 138/78       NPO Status: Time of last liquid consumption:                         Time of last solid consumption:                         Date of last liquid consumption: 22                        Date of last solid food consumption: 22    BMI:   Wt Readings from Last 3 Encounters:   22 185 lb (83.9 kg)   22 183 lb (83 kg)   22 186 lb 12.8 oz (84.7 kg)     Body mass index is 25.09 kg/m².     CBC:   Lab Results   Component Value Date/Time    MCV 97.0 2021 09:00 AM       CMP:   Lab Results   Component Value Date/Time     2022 09:54 AM    K 4.2 2022 09:54 AM     2022 09:54 AM    CO2 27 2022 09:54 AM    BUN 17 2022 09:54 AM    CREATININE 1.10 2022 09:54 AM    GFRAA >60 07/14/2022 09:54 AM    AGRATIO 1.8 12/08/2021 08:57 AM    LABGLOM >60 07/14/2022 09:54 AM    GLUCOSE 80 07/14/2022 09:54 AM    PROT 7.0 07/14/2022 09:54 AM    CALCIUM 8.5 07/14/2022 09:54 AM    BILITOT 0.5 07/14/2022 09:54 AM    ALKPHOS 56 07/14/2022 09:54 AM    ALKPHOS 66 12/08/2021 08:57 AM    AST 21 07/14/2022 09:54 AM    ALT 44 07/14/2022 09:54 AM       POC Tests: No results for input(s): POCGLU, POCNA, POCK, POCCL, POCBUN, POCHEMO, POCHCT in the last 72 hours. Coags: No results found for: PROTIME, INR, APTT    HCG (If Applicable): No results found for: PREGTESTUR, PREGSERUM, HCG, HCGQUANT     ABGs: No results found for: PHART, PO2ART, ECH6LYK, SNV9CWL, BEART, R5KRQSWC     Type & Screen (If Applicable):  No results found for: LABABO, LABRH    Drug/Infectious Status (If Applicable):  No results found for: HIV, HEPCAB    COVID-19 Screening (If Applicable): No results found for: COVID19        Anesthesia Evaluation  Patient summary reviewed and Nursing notes reviewed no history of anesthetic complications:   Airway: Mallampati: I  TM distance: >3 FB   Neck ROM: full     Dental: normal exam         Pulmonary:Negative Pulmonary ROS breath sounds clear to auscultation                             Cardiovascular:  Exercise tolerance: good (>4 METS),   (+) hypertension:,         Rhythm: regular  Rate: normal                    Neuro/Psych:   Negative Neuro/Psych ROS              GI/Hepatic/Renal: Neg GI/Hepatic/Renal ROS           ROS comment: polyps. Endo/Other:                      ROS comment: polyp Abdominal:             Vascular: Other Findings:           Anesthesia Plan      TIVA     ASA 3       Induction: intravenous. Anesthetic plan and risks discussed with patient.                         Kal Villanueva MD   7/28/2022

## 2022-07-28 NOTE — INTERVAL H&P NOTE
Update History & Physical    The patient's History and Physical of July 28, 2022 was reviewed with the patient and I examined the patient. There was no change. The surgical site was confirmed by the patient and me. Plan: The risks, benefits, expected outcome, and alternative to the recommended procedure have been discussed with the patient. Patient understands and wants to proceed with the procedure.      Electronically signed by Marta Benavidez MD on 7/28/2022 at 8:02 AM

## 2022-07-28 NOTE — ANESTHESIA POSTPROCEDURE EVALUATION
Department of Anesthesiology  Postprocedure Note    Patient: Nakita Clemons  MRN: [id number]  YOB: 1965  Date of evaluation: 7/28/2022      Procedure Summary     Date: 07/28/22 Room / Location: [facility] ENDO 04 / [facility] ENDOSCOPY    Anesthesia Start: 0838 Anesthesia Stop: 7514    Procedure: COLORECTAL CANCER SCREENING, NOT HIGH RISK (Lower GI Region) Diagnosis:       Special screening for malignant neoplasms, colon      ()    Surgeons: Roselia Dueñas MD Responsible Provider: Rene Garcia MD    Anesthesia Type: TIVA ASA Status: 3          Anesthesia Type: TIVA    Ana Phase I: Ana Score: 10    Ana Phase II: Ana Score: 10      Anesthesia Post Evaluation    Patient location during evaluation: PACU  Patient participation: complete - patient participated  Level of consciousness: awake and alert  Airway patency: patent  Nausea & Vomiting: no nausea  Cardiovascular status: hemodynamically stable  Respiratory status: acceptable  Hydration status: euvolemic

## 2022-08-17 RX ORDER — SILDENAFIL 100 MG/1
TABLET, FILM COATED ORAL
Qty: 30 TABLET | Refills: 11 | Status: SHIPPED | OUTPATIENT
Start: 2022-08-17

## 2023-01-22 DIAGNOSIS — E29.1 HYPOGONADISM IN MALE: Primary | ICD-10-CM

## 2023-01-31 ENCOUNTER — NURSE ONLY (OUTPATIENT)
Dept: FAMILY MEDICINE CLINIC | Facility: CLINIC | Age: 58
End: 2023-01-31

## 2023-01-31 DIAGNOSIS — Z11.59 SPECIAL SCREENING EXAMINATION FOR VIRAL DISEASE: ICD-10-CM

## 2023-01-31 DIAGNOSIS — E55.9 VITAMIN D DEFICIENCY: ICD-10-CM

## 2023-01-31 DIAGNOSIS — E29.1 HYPOGONADISM IN MALE: ICD-10-CM

## 2023-01-31 DIAGNOSIS — Z00.00 LABORATORY EXAMINATION ORDERED AS PART OF A COMPLETE PHYSICAL EXAMINATION: ICD-10-CM

## 2023-01-31 LAB
ALBUMIN SERPL-MCNC: 3.7 G/DL (ref 3.5–5)
ALBUMIN/GLOB SERPL: 1.1 (ref 0.4–1.6)
ALP SERPL-CCNC: 66 U/L (ref 50–136)
ALT SERPL-CCNC: 51 U/L (ref 12–65)
ANION GAP SERPL CALC-SCNC: 8 MMOL/L (ref 2–11)
AST SERPL-CCNC: 24 U/L (ref 15–37)
BASOPHILS # BLD: 0 K/UL (ref 0–0.2)
BASOPHILS NFR BLD: 0 % (ref 0–2)
BILIRUB SERPL-MCNC: 0.8 MG/DL (ref 0.2–1.1)
BUN SERPL-MCNC: 15 MG/DL (ref 6–23)
CALCIUM SERPL-MCNC: 8.6 MG/DL (ref 8.3–10.4)
CHLORIDE SERPL-SCNC: 105 MMOL/L (ref 101–110)
CHOLEST SERPL-MCNC: 198 MG/DL
CO2 SERPL-SCNC: 27 MMOL/L (ref 21–32)
CREAT SERPL-MCNC: 1.1 MG/DL (ref 0.8–1.5)
DIFFERENTIAL METHOD BLD: NORMAL
EOSINOPHIL # BLD: 0.1 K/UL (ref 0–0.8)
EOSINOPHIL NFR BLD: 1 % (ref 0.5–7.8)
ERYTHROCYTE [DISTWIDTH] IN BLOOD BY AUTOMATED COUNT: 13.5 % (ref 11.9–14.6)
GLOBULIN SER CALC-MCNC: 3.4 G/DL (ref 2.8–4.5)
GLUCOSE SERPL-MCNC: 90 MG/DL (ref 65–100)
HCT VFR BLD AUTO: 46.1 % (ref 41.1–50.3)
HDLC SERPL-MCNC: 56 MG/DL (ref 40–60)
HDLC SERPL: 3.5
HGB BLD-MCNC: 15.3 G/DL (ref 13.6–17.2)
HIV 1+2 AB+HIV1 P24 AG SERPL QL IA: NONREACTIVE
HIV 1/2 RESULT COMMENT: NORMAL
IMM GRANULOCYTES # BLD AUTO: 0 K/UL (ref 0–0.5)
IMM GRANULOCYTES NFR BLD AUTO: 0 % (ref 0–5)
LDLC SERPL CALC-MCNC: 130.8 MG/DL
LYMPHOCYTES # BLD: 1.9 K/UL (ref 0.5–4.6)
LYMPHOCYTES NFR BLD: 34 % (ref 13–44)
MCH RBC QN AUTO: 31.3 PG (ref 26.1–32.9)
MCHC RBC AUTO-ENTMCNC: 33.2 G/DL (ref 31.4–35)
MCV RBC AUTO: 94.3 FL (ref 82–102)
MONOCYTES # BLD: 0.5 K/UL (ref 0.1–1.3)
MONOCYTES NFR BLD: 10 % (ref 4–12)
NEUTS SEG # BLD: 3 K/UL (ref 1.7–8.2)
NEUTS SEG NFR BLD: 55 % (ref 43–78)
NRBC # BLD: 0 K/UL (ref 0–0.2)
PLATELET # BLD AUTO: 220 K/UL (ref 150–450)
PMV BLD AUTO: 9.6 FL (ref 9.4–12.3)
POTASSIUM SERPL-SCNC: 4 MMOL/L (ref 3.5–5.1)
PROT SERPL-MCNC: 7.1 G/DL (ref 6.3–8.2)
PSA SERPL-MCNC: 3.9 NG/ML
RBC # BLD AUTO: 4.89 M/UL (ref 4.23–5.6)
SODIUM SERPL-SCNC: 140 MMOL/L (ref 133–143)
TRIGL SERPL-MCNC: 56 MG/DL (ref 35–150)
TSH, 3RD GENERATION: 1.32 UIU/ML (ref 0.36–3.74)
VLDLC SERPL CALC-MCNC: 11.2 MG/DL (ref 6–23)
WBC # BLD AUTO: 5.6 K/UL (ref 4.3–11.1)

## 2023-02-01 LAB
25(OH)D3 SERPL-MCNC: 40 NG/ML (ref 30–100)
HCV AB SER QL: NONREACTIVE

## 2023-02-04 LAB
TESTOST FREE SERPL-MCNC: 7.7 PG/ML (ref 7.2–24)
TESTOST SERPL-MCNC: 427 NG/DL (ref 264–916)

## 2023-03-02 ENCOUNTER — OFFICE VISIT (OUTPATIENT)
Dept: FAMILY MEDICINE CLINIC | Facility: CLINIC | Age: 58
End: 2023-03-02
Payer: COMMERCIAL

## 2023-03-02 VITALS
WEIGHT: 180 LBS | SYSTOLIC BLOOD PRESSURE: 134 MMHG | HEIGHT: 72 IN | HEART RATE: 69 BPM | BODY MASS INDEX: 24.38 KG/M2 | DIASTOLIC BLOOD PRESSURE: 76 MMHG | OXYGEN SATURATION: 99 %

## 2023-03-02 DIAGNOSIS — E55.9 VITAMIN D DEFICIENCY: ICD-10-CM

## 2023-03-02 DIAGNOSIS — E29.1 HYPOGONADISM IN MALE: ICD-10-CM

## 2023-03-02 DIAGNOSIS — Z00.00 ROUTINE GENERAL MEDICAL EXAMINATION AT A HEALTH CARE FACILITY: Primary | ICD-10-CM

## 2023-03-02 DIAGNOSIS — E78.00 PURE HYPERCHOLESTEROLEMIA: ICD-10-CM

## 2023-03-02 DIAGNOSIS — I10 ESSENTIAL HYPERTENSION: ICD-10-CM

## 2023-03-02 PROCEDURE — 3078F DIAST BP <80 MM HG: CPT | Performed by: NURSE PRACTITIONER

## 2023-03-02 PROCEDURE — 3075F SYST BP GE 130 - 139MM HG: CPT | Performed by: NURSE PRACTITIONER

## 2023-03-02 PROCEDURE — 99396 PREV VISIT EST AGE 40-64: CPT | Performed by: NURSE PRACTITIONER

## 2023-03-02 RX ORDER — CYCLOBENZAPRINE HCL 10 MG
TABLET ORAL
Qty: 30 TABLET | Refills: 5 | Status: SHIPPED | OUTPATIENT
Start: 2023-03-02

## 2023-03-02 RX ORDER — ATENOLOL 50 MG/1
50 TABLET ORAL DAILY
Qty: 90 TABLET | Refills: 3 | Status: SHIPPED | OUTPATIENT
Start: 2023-03-02

## 2023-03-02 RX ORDER — TESTOSTERONE CYPIONATE 200 MG/ML
INJECTION INTRAMUSCULAR
Qty: 10 ML | Refills: 1 | Status: SHIPPED | OUTPATIENT
Start: 2023-03-02 | End: 2023-08-29

## 2023-03-02 RX ORDER — SILDENAFIL 100 MG/1
TABLET, FILM COATED ORAL
Qty: 30 TABLET | Refills: 11 | Status: SHIPPED | OUTPATIENT
Start: 2023-03-02

## 2023-03-02 RX ORDER — ERGOCALCIFEROL 1.25 MG/1
50000 CAPSULE ORAL
Qty: 8 CAPSULE | Refills: 3 | Status: SHIPPED | OUTPATIENT
Start: 2023-03-02

## 2023-03-02 RX ORDER — LOSARTAN POTASSIUM 50 MG/1
50 TABLET ORAL DAILY
Qty: 90 TABLET | Refills: 3 | Status: SHIPPED | OUTPATIENT
Start: 2023-03-02

## 2023-03-02 ASSESSMENT — PATIENT HEALTH QUESTIONNAIRE - PHQ9
1. LITTLE INTEREST OR PLEASURE IN DOING THINGS: 0
SUM OF ALL RESPONSES TO PHQ QUESTIONS 1-9: 0
SUM OF ALL RESPONSES TO PHQ9 QUESTIONS 1 & 2: 0
SUM OF ALL RESPONSES TO PHQ QUESTIONS 1-9: 0
SUM OF ALL RESPONSES TO PHQ9 QUESTIONS 1 & 2: 0
SUM OF ALL RESPONSES TO PHQ QUESTIONS 1-9: 0
2. FEELING DOWN, DEPRESSED OR HOPELESS: 0
2. FEELING DOWN, DEPRESSED OR HOPELESS: 0
SUM OF ALL RESPONSES TO PHQ QUESTIONS 1-9: 0
1. LITTLE INTEREST OR PLEASURE IN DOING THINGS: 0
SUM OF ALL RESPONSES TO PHQ QUESTIONS 1-9: 0
SUM OF ALL RESPONSES TO PHQ QUESTIONS 1-9: 0

## 2023-03-02 ASSESSMENT — ENCOUNTER SYMPTOMS
EYE ITCHING: 0
COLOR CHANGE: 0
ALLERGIC/IMMUNOLOGIC NEGATIVE: 1
VOMITING: 0
EYE DISCHARGE: 0
SINUS PAIN: 0
ANAL BLEEDING: 0
DIARRHEA: 0
EYE REDNESS: 0
ABDOMINAL DISTENTION: 0
ABDOMINAL PAIN: 0
RECTAL PAIN: 0
CONSTIPATION: 0
GASTROINTESTINAL NEGATIVE: 1
BLOOD IN STOOL: 0
PHOTOPHOBIA: 0
SORE THROAT: 0
SINUS PRESSURE: 0
STRIDOR: 0
SHORTNESS OF BREATH: 0
EYE PAIN: 0
NAUSEA: 0
EYES NEGATIVE: 1
WHEEZING: 0
FACIAL SWELLING: 0
APNEA: 0
RESPIRATORY NEGATIVE: 1
VOICE CHANGE: 0
TROUBLE SWALLOWING: 0
BACK PAIN: 0
CHEST TIGHTNESS: 0
CHOKING: 0
RHINORRHEA: 0
COUGH: 0

## 2023-03-02 ASSESSMENT — ANXIETY QUESTIONNAIRES
7. FEELING AFRAID AS IF SOMETHING AWFUL MIGHT HAPPEN: 0
1. FEELING NERVOUS, ANXIOUS, OR ON EDGE: 0
5. BEING SO RESTLESS THAT IT IS HARD TO SIT STILL: 0
2. NOT BEING ABLE TO STOP OR CONTROL WORRYING: 0
4. TROUBLE RELAXING: 0
GAD7 TOTAL SCORE: 0
6. BECOMING EASILY ANNOYED OR IRRITABLE: 0
3. WORRYING TOO MUCH ABOUT DIFFERENT THINGS: 0
IF YOU CHECKED OFF ANY PROBLEMS ON THIS QUESTIONNAIRE, HOW DIFFICULT HAVE THESE PROBLEMS MADE IT FOR YOU TO DO YOUR WORK, TAKE CARE OF THINGS AT HOME, OR GET ALONG WITH OTHER PEOPLE: NOT DIFFICULT AT ALL

## 2023-03-02 NOTE — PROGRESS NOTES
PROGRESS NOTE    Chief Complaint   Patient presents with    Annual Exam     Presenting for CPX and to discuss labs, HTN and testicular hypofunction. SUBJECTIVE:     Arthur Huitron is a very pleasant 59 y.o. male with hx of hypertension, hyperlipidemia, anxiety, hypogonadism , seen today in office for lab results review and medication refill. He is due for his annual medical physical.  He reports doing well overall. He did have a hospital admission in November for emergency appendicitis/appendectomy. He reports feeling well since then. He reports he has been picking up extra shifts for work due to being short staffed. Otherwise, he reports feeling well. Patient reports no chest pain, no shortness of breath, no unilateral or focal weakness, no orthopnea or PND. GI and  review of systems is unremarkable. Past Medical History, Past Surgical History, Family history, Social History, and Medications were all reviewed with the patient today and updated as necessary.        Current Outpatient Medications   Medication Sig Dispense Refill    testosterone cypionate (DEPOTESTOTERONE CYPIONATE) 200 MG/ML injection INJECT 1ML INTRAMUSCULARLY EVERY 21 DAYS 10 mL 1    atenolol (TENORMIN) 50 MG tablet Take 1 tablet by mouth daily For blood pressure 90 tablet 3    losartan (COZAAR) 50 MG tablet Take 1 tablet by mouth daily For blood pressure 90 tablet 3    ergocalciferol (ERGOCALCIFEROL) 1.25 MG (01143 UT) capsule Take 1 capsule by mouth every 14 days With food for vitamin D supplementation 8 capsule 3    cyclobenzaprine (FLEXERIL) 10 MG tablet TAKE ONE-HALF TO ONE TABLET BY MOUTH EVERY EVENING AS NEEDED FOR MUSCLE SPASM 30 tablet 5    sildenafil (VIAGRA) 100 MG tablet TAKE ONE TABLET BY MOUTH ONE TIME DAILY AS NEEDED 30 tablet 11    hydrOXYzine (ATARAX) 25 MG tablet Take 1/2 to 1 tablet orally up to twice daily as needed for anxiety      Naproxen Sodium 220 MG CAPS Take by mouth       No current facility-administered medications for this visit. Allergies   Allergen Reactions    Tramadol Other (See Comments)     Patient states not allergic    Hydrocodone-Acetaminophen Nausea And Vomiting     Patient Active Problem List   Diagnosis    Hypogonadism in male    H/O adenomatous polyp of colon    Tubulovillous adenoma polyp of colon     Past Medical History:   Diagnosis Date    Chronic pain 2014    back pain and right buttocks, leg , foot    HNP (herniated nucleus pulposus), lumbar     Hypertension      Past Surgical History:   Procedure Laterality Date    COLONOSCOPY N/A 10/13/2016    COLONOSCOPY/ 27 performed by Lynnette Grace MD at [facility] ENDOSCOPY    COLONOSCOPY N/A 2022    COLORECTAL CANCER SCREENING, NOT HIGH RISK performed by Lynnette Grace MD at [facility],FORCEP/CAUTERY  10/13/2016         LUMBAR LAMINECTOMY      x 2     Family History   Problem Relation Age of Onset    Cancer Mother     Cancer Sister     Cancer Father     Hypertension Mother      Social History     Tobacco Use    Smoking status: Former     Packs/day: 0.10     Types: Cigarettes     Quit date: 2004     Years since quittin.1    Smokeless tobacco: Never   Substance Use Topics    Alcohol use: Not Currently         REVIEW OF SYSTEM    Review of Systems   Constitutional: Negative. Negative for activity change, appetite change, chills, diaphoresis, fatigue, fever and unexpected weight change. HENT: Negative. Negative for congestion, dental problem, drooling, ear discharge, ear pain, facial swelling, hearing loss, mouth sores, nosebleeds, postnasal drip, rhinorrhea, sinus pressure, sinus pain, sneezing, sore throat, tinnitus, trouble swallowing and voice change. Eyes: Negative. Negative for photophobia, pain, discharge, redness, itching and visual disturbance. Respiratory: Negative. Negative for apnea, cough, choking, chest tightness, shortness of breath, wheezing and stridor. Cardiovascular: Negative. Negative for chest pain, palpitations and leg swelling. Gastrointestinal: Negative. Negative for abdominal distention, abdominal pain, anal bleeding, blood in stool, constipation, diarrhea, nausea, rectal pain and vomiting. Endocrine: Negative. Negative for cold intolerance, heat intolerance, polydipsia, polyphagia and polyuria. Genitourinary: Negative. Negative for decreased urine volume, difficulty urinating, dysuria, enuresis, flank pain, frequency, genital sores, hematuria and urgency. Musculoskeletal: Negative. Negative for arthralgias, back pain, gait problem, joint swelling, myalgias, neck pain and neck stiffness. Skin: Negative. Negative for color change, pallor, rash and wound. Allergic/Immunologic: Negative. Negative for environmental allergies, food allergies and immunocompromised state. Neurological: Negative. Negative for dizziness, tremors, seizures, syncope, facial asymmetry, speech difficulty, weakness, light-headedness, numbness and headaches. Hematological: Negative. Negative for adenopathy. Does not bruise/bleed easily. Psychiatric/Behavioral: Negative. Negative for agitation, behavioral problems, confusion, decreased concentration, dysphoric mood, hallucinations, self-injury, sleep disturbance and suicidal ideas. The patient is not nervous/anxious and is not hyperactive. OBJECTIVE:  /76   Pulse 69   Ht 6' (1.829 m)   Wt 180 lb (81.6 kg)   SpO2 99%   BMI 24.41 kg/m²      Physical Exam  Vitals and nursing note reviewed. Constitutional:       General: He is not in acute distress. Appearance: Normal appearance. He is not ill-appearing, toxic-appearing or diaphoretic. HENT:      Head: Normocephalic and atraumatic. Right Ear: Tympanic membrane, ear canal and external ear normal. There is no impacted cerumen. Left Ear: Tympanic membrane, ear canal and external ear normal. There is no impacted cerumen.       Nose: Nose normal. No congestion or rhinorrhea. Eyes:      General: No scleral icterus. Right eye: No discharge. Left eye: No discharge. Extraocular Movements: Extraocular movements intact. Conjunctiva/sclera: Conjunctivae normal.      Pupils: Pupils are equal, round, and reactive to light. Neck:      Vascular: No carotid bruit. Cardiovascular:      Rate and Rhythm: Normal rate and regular rhythm. Pulses: Normal pulses. Heart sounds: Normal heart sounds. No murmur heard. No friction rub. No gallop. Pulmonary:      Effort: Pulmonary effort is normal. No respiratory distress. Breath sounds: Normal breath sounds. No stridor. No wheezing, rhonchi or rales. Chest:      Chest wall: No tenderness. Abdominal:      General: Abdomen is flat. Bowel sounds are normal. There is no distension. Palpations: Abdomen is soft. There is no mass. Tenderness: There is no abdominal tenderness. There is no right CVA tenderness, left CVA tenderness, guarding or rebound. Negative signs include Trujillo's sign, Rovsing's sign, McBurney's sign, psoas sign and obturator sign. Hernia: No hernia is present. Genitourinary:     Comments: Deferred  Musculoskeletal:         General: Normal range of motion. Cervical back: Normal range of motion and neck supple. No rigidity or tenderness. Lymphadenopathy:      Cervical: No cervical adenopathy. Skin:     General: Skin is warm and dry. Capillary Refill: Capillary refill takes less than 2 seconds. Findings: No rash. Neurological:      General: No focal deficit present. Mental Status: He is alert and oriented to person, place, and time. Psychiatric:         Mood and Affect: Mood normal.         Behavior: Behavior normal.         Thought Content: Thought content normal.         Judgment: Judgment normal.         Medical problems and test results were reviewed with the patient today.    Lab Results   Component Value Date     01/31/2023    K 4.0 01/31/2023     01/31/2023    CO2 27 01/31/2023    BUN 15 01/31/2023    CREATININE 1.10 01/31/2023    GLUCOSE 90 01/31/2023    CALCIUM 8.6 01/31/2023    PROT 7.1 01/31/2023    LABALBU 3.7 01/31/2023    BILITOT 0.8 01/31/2023    ALKPHOS 66 01/31/2023    AST 24 01/31/2023    ALT 51 01/31/2023    LABGLOM >60 01/31/2023    GFRAA >60 07/14/2022    AGRATIO 1.8 12/08/2021    GLOB 3.4 01/31/2023       Lab Results   Component Value Date    WBC 5.6 01/31/2023    HGB 15.3 01/31/2023    HCT 46.1 01/31/2023    MCV 94.3 01/31/2023     01/31/2023     Lab Results   Component Value Date    PSA 3.9 01/31/2023    PSA 3.1 07/14/2022    PSA 2.7 12/08/2021     Lab Results   Component Value Date    CHOL 198 01/31/2023    CHOL 184 07/14/2022    CHOL 178 12/08/2021     Lab Results   Component Value Date    TRIG 56 01/31/2023    TRIG 62 07/14/2022    TRIG 95 12/08/2021     Lab Results   Component Value Date    HDL 56 01/31/2023    HDL 52 07/14/2022    HDL 50 12/08/2021     Lab Results   Component Value Date    LDLCALC 130.8 (H) 01/31/2023    LDLCALC 119.6 (H) 07/14/2022    LDLCALC 111 (H) 12/08/2021     Lab Results   Component Value Date    LABVLDL 11.2 01/31/2023    LABVLDL 12.4 07/14/2022    VLDL 17 12/08/2021    VLDL 15 07/27/2021    VLDL 14 11/09/2020     Lab Results   Component Value Date    CHOLHDLRATIO 3.5 01/31/2023    CHOLHDLRATIO 3.5 07/14/2022     Lab Results   Component Value Date/Time    VITD25 40.0 01/31/2023 09:23 AM          ASSESSMENT and PLAN    1. Routine general medical examination at a health care facility   Anticipatory guidance for age-seatbelts, avoid cell phone use in car (may use hands free), no texting while driving, avoid social drugs and tobacco, limit alcohol, fall safety, personal safety with appropriate use of helmets and equipment for protection, and appropriate use of sun screen and sun protection to prevent skin cancer. Encourage healthy diet and exercise  daily. Patient is due for shingle vaccine. Discussed current CDC recommendation for immunization. Pt will check with pharmacy for coverage and administration. He agrees to reach back out to let us know once vaccination is completed for record update. 2. Pure hypercholesterolemia   Lipid panel slightly elevated with LDL noted at 130. No new medication. Discussed with patient that high cholesterol can significantly increase risk of developing heart disease, heart attack, and stroke. Cholesterol can be lowered with lifestyle changes and certain medications. Recommend to reduce total and saturated fat in diet, lose weight, participate in aerobic exercise, and eat plenty of fruits and vegetables. Repeat labs in 6 months. 3. Essential hypertension  -     atenolol (TENORMIN) 50 MG tablet; Take 1 tablet by mouth daily For blood pressure, Disp-90 tablet, R-3Normal  -     losartan (COZAAR) 50 MG tablet; Take 1 tablet by mouth daily For blood pressure, Disp-90 tablet, R-3Normal    Blood pressure today in office 134/76. Stable. Continue current therapy. 4. Hypogonadism in male  -     testosterone cypionate (DEPOTESTOTERONE CYPIONATE) 200 MG/ML injection; INJECT 1ML INTRAMUSCULARLY EVERY 21 DAYS, Disp-10 mL, R-1Normal    Stable. Continue with current supplementation. 5. Vitamin D deficiency  -     ergocalciferol (ERGOCALCIFEROL) 1.25 MG (01665 UT) capsule; Take 1 capsule by mouth every 14 days With food for vitamin D supplementation, Disp-8 capsule, R-3Normal    Normal.  We will have patient take ergocalciferol every 14 days for supplementation and maintenance therapy.     Orders Placed This Encounter   Procedures    CBC with Auto Differential     Standing Status:   Future     Standing Expiration Date:   3/2/2024    Comprehensive Metabolic Panel     Standing Status:   Future     Standing Expiration Date:   3/2/2024    Lipid Panel     Standing Status:   Future     Standing Expiration Date:   3/2/2024 Order Specific Question:   Is Patient Fasting?/# of Hours     Answer:   8    Vitamin D 25 Hydroxy     Standing Status:   Future     Standing Expiration Date:   3/2/2024    PSA, Diagnostic     Standing Status:   Future     Standing Expiration Date:   3/2/2024    Testosterone, free, total     Standing Status:   Future     Standing Expiration Date:   3/2/2024           Elements of this note have been dictated using speech recognition software. As a result, errors of speech recognition may have occurred. On this date 3/2/2023 I have spent 30 minutes reviewing previous notes, test results and face to face with the patient discussing the diagnosis and importance of compliance with the treatment plan as well as documenting on the day of the visit. Greater than 50% of this visit was spent counseling the patient about test results, prognosis, importance of compliance, education about disease process, benefits of medications, instructions for management of acute symptoms, and follow up plans. Return in about 6 months (around 9/2/2023) for Follow up with fasting labs prior.      Johnie Colorado, GILL - CNP

## 2023-09-01 DIAGNOSIS — E29.1 HYPOGONADISM IN MALE: Primary | ICD-10-CM

## 2023-09-11 DIAGNOSIS — E29.1 HYPOGONADISM IN MALE: ICD-10-CM

## 2023-09-11 RX ORDER — TESTOSTERONE CYPIONATE 200 MG/ML
INJECTION, SOLUTION INTRAMUSCULAR
Qty: 10 ML | Refills: 1 | Status: SHIPPED | OUTPATIENT
Start: 2023-09-11 | End: 2024-03-09

## 2023-09-28 ENCOUNTER — NURSE ONLY (OUTPATIENT)
Dept: FAMILY MEDICINE CLINIC | Facility: CLINIC | Age: 58
End: 2023-09-28
Payer: COMMERCIAL

## 2023-09-28 DIAGNOSIS — E55.9 VITAMIN D DEFICIENCY: ICD-10-CM

## 2023-09-28 DIAGNOSIS — E29.1 HYPOGONADISM IN MALE: ICD-10-CM

## 2023-09-28 DIAGNOSIS — I10 ESSENTIAL HYPERTENSION: ICD-10-CM

## 2023-09-28 DIAGNOSIS — E78.00 PURE HYPERCHOLESTEROLEMIA: ICD-10-CM

## 2023-09-28 LAB
25(OH)D3 SERPL-MCNC: 38.1 NG/ML (ref 30–100)
ALBUMIN SERPL-MCNC: 3.7 G/DL (ref 3.2–4.6)
ALBUMIN/GLOB SERPL: 1.1 (ref 0.4–1.6)
ALP SERPL-CCNC: 67 U/L (ref 50–136)
ALT SERPL-CCNC: 43 U/L (ref 12–65)
ANION GAP SERPL CALC-SCNC: 8 MMOL/L (ref 2–11)
AST SERPL-CCNC: 25 U/L (ref 15–37)
BASOPHILS # BLD: 0 K/UL (ref 0–0.2)
BASOPHILS NFR BLD: 1 % (ref 0–2)
BILIRUB SERPL-MCNC: 0.5 MG/DL (ref 0.2–1.1)
BUN SERPL-MCNC: 22 MG/DL (ref 8–23)
CALCIUM SERPL-MCNC: 8.6 MG/DL (ref 8.3–10.4)
CHLORIDE SERPL-SCNC: 109 MMOL/L (ref 101–110)
CHOLEST SERPL-MCNC: 206 MG/DL
CO2 SERPL-SCNC: 28 MMOL/L (ref 21–32)
CREAT SERPL-MCNC: 1.2 MG/DL (ref 0.8–1.5)
DIFFERENTIAL METHOD BLD: NORMAL
EOSINOPHIL # BLD: 0.1 K/UL (ref 0–0.8)
EOSINOPHIL NFR BLD: 1 % (ref 0.5–7.8)
ERYTHROCYTE [DISTWIDTH] IN BLOOD BY AUTOMATED COUNT: 13.2 % (ref 11.9–14.6)
GLOBULIN SER CALC-MCNC: 3.4 G/DL (ref 2.8–4.5)
GLUCOSE SERPL-MCNC: 91 MG/DL (ref 65–100)
HCT VFR BLD AUTO: 45.7 % (ref 41.1–50.3)
HDLC SERPL-MCNC: 58 MG/DL (ref 40–60)
HDLC SERPL: 3.6
HGB BLD-MCNC: 14.7 G/DL (ref 13.6–17.2)
IMM GRANULOCYTES # BLD AUTO: 0 K/UL (ref 0–0.5)
IMM GRANULOCYTES NFR BLD AUTO: 0 % (ref 0–5)
LDLC SERPL CALC-MCNC: 128.4 MG/DL
LYMPHOCYTES # BLD: 2.4 K/UL (ref 0.5–4.6)
LYMPHOCYTES NFR BLD: 40 % (ref 13–44)
MCH RBC QN AUTO: 31.2 PG (ref 26.1–32.9)
MCHC RBC AUTO-ENTMCNC: 32.2 G/DL (ref 31.4–35)
MCV RBC AUTO: 97 FL (ref 82–102)
MONOCYTES # BLD: 0.5 K/UL (ref 0.1–1.3)
MONOCYTES NFR BLD: 9 % (ref 4–12)
NEUTS SEG # BLD: 3.1 K/UL (ref 1.7–8.2)
NEUTS SEG NFR BLD: 49 % (ref 43–78)
NRBC # BLD: 0 K/UL (ref 0–0.2)
PLATELET # BLD AUTO: 201 K/UL (ref 150–450)
PMV BLD AUTO: 9.7 FL (ref 9.4–12.3)
POTASSIUM SERPL-SCNC: 4.2 MMOL/L (ref 3.5–5.1)
PROT SERPL-MCNC: 7.1 G/DL (ref 6.3–8.2)
PSA SERPL-MCNC: 4.5 NG/ML
RBC # BLD AUTO: 4.71 M/UL (ref 4.23–5.6)
SODIUM SERPL-SCNC: 145 MMOL/L (ref 133–143)
TRIGL SERPL-MCNC: 98 MG/DL (ref 35–150)
VLDLC SERPL CALC-MCNC: 19.6 MG/DL (ref 6–23)
WBC # BLD AUTO: 6.1 K/UL (ref 4.3–11.1)

## 2023-09-28 PROCEDURE — 36415 COLL VENOUS BLD VENIPUNCTURE: CPT | Performed by: NURSE PRACTITIONER

## 2023-09-30 LAB — TESTOST SERPL-MCNC: 433 NG/DL (ref 264–916)

## 2023-10-06 ENCOUNTER — OFFICE VISIT (OUTPATIENT)
Dept: FAMILY MEDICINE CLINIC | Facility: CLINIC | Age: 58
End: 2023-10-06

## 2023-10-06 VITALS
SYSTOLIC BLOOD PRESSURE: 140 MMHG | HEART RATE: 63 BPM | OXYGEN SATURATION: 99 % | WEIGHT: 186.4 LBS | BODY MASS INDEX: 25.25 KG/M2 | HEIGHT: 72 IN | DIASTOLIC BLOOD PRESSURE: 80 MMHG

## 2023-10-06 DIAGNOSIS — E55.9 VITAMIN D DEFICIENCY: ICD-10-CM

## 2023-10-06 DIAGNOSIS — E78.00 PURE HYPERCHOLESTEROLEMIA: ICD-10-CM

## 2023-10-06 DIAGNOSIS — Z23 NEED FOR INFLUENZA VACCINATION: ICD-10-CM

## 2023-10-06 DIAGNOSIS — R97.20 ELEVATED PSA, LESS THAN 10 NG/ML: ICD-10-CM

## 2023-10-06 DIAGNOSIS — I10 ESSENTIAL HYPERTENSION: ICD-10-CM

## 2023-10-06 DIAGNOSIS — E29.1 HYPOGONADISM IN MALE: Primary | ICD-10-CM

## 2023-10-06 LAB
TESTOST FREE SERPL-MCNC: 9.5 PG/ML (ref 6.6–18.1)
TESTOST SERPL-MCNC: 433 NG/DL (ref 264–916)

## 2023-10-06 RX ORDER — CIPROFLOXACIN 500 MG/1
500 TABLET, FILM COATED ORAL 2 TIMES DAILY
Qty: 20 TABLET | Refills: 0 | Status: SHIPPED | OUTPATIENT
Start: 2023-10-06 | End: 2023-10-16

## 2023-10-06 SDOH — ECONOMIC STABILITY: FOOD INSECURITY: WITHIN THE PAST 12 MONTHS, THE FOOD YOU BOUGHT JUST DIDN'T LAST AND YOU DIDN'T HAVE MONEY TO GET MORE.: NEVER TRUE

## 2023-10-06 SDOH — ECONOMIC STABILITY: HOUSING INSECURITY
IN THE LAST 12 MONTHS, WAS THERE A TIME WHEN YOU DID NOT HAVE A STEADY PLACE TO SLEEP OR SLEPT IN A SHELTER (INCLUDING NOW)?: NO

## 2023-10-06 SDOH — ECONOMIC STABILITY: INCOME INSECURITY: HOW HARD IS IT FOR YOU TO PAY FOR THE VERY BASICS LIKE FOOD, HOUSING, MEDICAL CARE, AND HEATING?: NOT HARD AT ALL

## 2023-10-06 SDOH — ECONOMIC STABILITY: FOOD INSECURITY: WITHIN THE PAST 12 MONTHS, YOU WORRIED THAT YOUR FOOD WOULD RUN OUT BEFORE YOU GOT MONEY TO BUY MORE.: NEVER TRUE

## 2023-10-06 ASSESSMENT — ENCOUNTER SYMPTOMS
EYE ITCHING: 0
RHINORRHEA: 0
BACK PAIN: 0
COUGH: 0
ANAL BLEEDING: 0
CHOKING: 0
SORE THROAT: 0
EYE DISCHARGE: 0
EYE PAIN: 0
ALLERGIC/IMMUNOLOGIC NEGATIVE: 1
EYES NEGATIVE: 1
GASTROINTESTINAL NEGATIVE: 1
CONSTIPATION: 0
BLOOD IN STOOL: 0
CHEST TIGHTNESS: 0
VOICE CHANGE: 0
NAUSEA: 0
DIARRHEA: 0
TROUBLE SWALLOWING: 0
EYE REDNESS: 0
SHORTNESS OF BREATH: 0
STRIDOR: 0
SINUS PAIN: 0
FACIAL SWELLING: 0
APNEA: 0
COLOR CHANGE: 0
WHEEZING: 0
RESPIRATORY NEGATIVE: 1
PHOTOPHOBIA: 0
ABDOMINAL DISTENTION: 0
VOMITING: 0
RECTAL PAIN: 0
SINUS PRESSURE: 0
ABDOMINAL PAIN: 0

## 2023-11-06 ENCOUNTER — OFFICE VISIT (OUTPATIENT)
Dept: FAMILY MEDICINE CLINIC | Facility: CLINIC | Age: 58
End: 2023-11-06
Payer: COMMERCIAL

## 2023-11-06 VITALS
OXYGEN SATURATION: 99 % | DIASTOLIC BLOOD PRESSURE: 88 MMHG | SYSTOLIC BLOOD PRESSURE: 138 MMHG | BODY MASS INDEX: 25.68 KG/M2 | WEIGHT: 189.6 LBS | HEIGHT: 72 IN | HEART RATE: 68 BPM

## 2023-11-06 DIAGNOSIS — R97.20 ELEVATED PSA, LESS THAN 10 NG/ML: Primary | ICD-10-CM

## 2023-11-06 DIAGNOSIS — I10 ESSENTIAL (PRIMARY) HYPERTENSION: ICD-10-CM

## 2023-11-06 DIAGNOSIS — E29.1 HYPOGONADISM IN MALE: ICD-10-CM

## 2023-11-06 LAB
BILIRUBIN, URINE, POC: NEGATIVE
BLOOD URINE, POC: NEGATIVE
GLUCOSE URINE, POC: NEGATIVE
KETONES, URINE, POC: NEGATIVE
LEUKOCYTE ESTERASE, URINE, POC: NEGATIVE
NITRITE, URINE, POC: NEGATIVE
PH, URINE, POC: 5.5 (ref 4.6–8)
PROTEIN,URINE, POC: NEGATIVE
SPECIFIC GRAVITY, URINE, POC: 1.03 (ref 1–1.03)
URINALYSIS CLARITY, POC: CLEAR
URINALYSIS COLOR, POC: YELLOW
UROBILINOGEN, POC: NORMAL

## 2023-11-06 PROCEDURE — 81003 URINALYSIS AUTO W/O SCOPE: CPT | Performed by: NURSE PRACTITIONER

## 2023-11-06 PROCEDURE — 99214 OFFICE O/P EST MOD 30 MIN: CPT | Performed by: NURSE PRACTITIONER

## 2023-11-06 PROCEDURE — 3079F DIAST BP 80-89 MM HG: CPT | Performed by: NURSE PRACTITIONER

## 2023-11-06 PROCEDURE — 3075F SYST BP GE 130 - 139MM HG: CPT | Performed by: NURSE PRACTITIONER

## 2023-11-06 ASSESSMENT — ENCOUNTER SYMPTOMS
ABDOMINAL PAIN: 0
RHINORRHEA: 0
RECTAL PAIN: 0
DIARRHEA: 0
PHOTOPHOBIA: 0
ABDOMINAL DISTENTION: 0
EYE REDNESS: 0
VOMITING: 0
EYE DISCHARGE: 0
SINUS PAIN: 0
COLOR CHANGE: 0
CHEST TIGHTNESS: 0
EYE PAIN: 0
TROUBLE SWALLOWING: 0
CONSTIPATION: 0
WHEEZING: 0
VOICE CHANGE: 0
ALLERGIC/IMMUNOLOGIC NEGATIVE: 1
ANAL BLEEDING: 0
APNEA: 0
EYE ITCHING: 0
BACK PAIN: 0
GASTROINTESTINAL NEGATIVE: 1
BLOOD IN STOOL: 0
STRIDOR: 0
RESPIRATORY NEGATIVE: 1
NAUSEA: 0
SHORTNESS OF BREATH: 0
SORE THROAT: 0
EYES NEGATIVE: 1
SINUS PRESSURE: 0
FACIAL SWELLING: 0
CHOKING: 0
COUGH: 0

## 2023-11-06 NOTE — PROGRESS NOTES
Constitutional:       General: He is not in acute distress. Appearance: Normal appearance. He is not ill-appearing, toxic-appearing or diaphoretic. HENT:      Head: Normocephalic and atraumatic. Neck:      Vascular: No carotid bruit. Cardiovascular:      Rate and Rhythm: Normal rate and regular rhythm. Pulses: Normal pulses. Heart sounds: Normal heart sounds. Pulmonary:      Effort: Pulmonary effort is normal.      Breath sounds: Normal breath sounds. Abdominal:      General: Abdomen is flat. Bowel sounds are normal.      Palpations: Abdomen is soft. Genitourinary:     Prostate: Normal.      Rectum: Normal. Guaiac result negative. Musculoskeletal:      Cervical back: Normal range of motion and neck supple. No rigidity or tenderness. Lymphadenopathy:      Cervical: No cervical adenopathy. Neurological:      General: No focal deficit present. Mental Status: He is alert and oriented to person, place, and time. Psychiatric:         Mood and Affect: Mood normal.         Behavior: Behavior normal.         Thought Content: Thought content normal.         Judgment: Judgment normal.           Medical problems and test results were reviewed with the patient today. Lab Results   Component Value Date    PSA 4.5 (H) 09/28/2023         ASSESSMENT and PLAN    1. Elevated PSA, less than 10 ng/ml  -     PSA, Total and Free; Future  -     AMB POC URINALYSIS DIP STICK AUTO W/O MICRO; Future  -     Chlamydia, Gonorrhea, Trichomoniasis; Future    Patient with a slightly elevated PSA on 9/28 at 4.5. At that time he did endorse having a slight urinary urgency. He was given a course of Cipro and reports feeling much better and has no symptoms today. We will recheck his PSA level today. Manual JORDAN showed a normal size PSA with out nodules. Negative Hemoccult. We will also repeat a UA. He is  with 1 partner. He reports no signs or symptoms of penile discharge or lesion.   However, we

## 2023-11-07 ENCOUNTER — TELEPHONE (OUTPATIENT)
Dept: FAMILY MEDICINE CLINIC | Facility: CLINIC | Age: 58
End: 2023-11-07

## 2023-11-07 LAB
PSA FREE MFR SERPL: 14.6 %
PSA FREE SERPL-MCNC: 0.6 NG/ML
PSA SERPL-MCNC: 4.1 NG/ML

## 2023-11-07 NOTE — TELEPHONE ENCOUNTER
Mr. Matt Clemons needs a 3 month labs and follow-up visit scheduled. He's requesting that it is sent to [facility] please. Thank you!

## 2023-11-08 LAB
C TRACH RRNA SPEC QL NAA+PROBE: NEGATIVE
N GONORRHOEA RRNA SPEC QL NAA+PROBE: NEGATIVE
SPECIMEN SOURCE: NORMAL
T VAGINALIS RRNA SPEC QL NAA+PROBE: NEGATIVE

## 2024-01-24 ENCOUNTER — OFFICE VISIT (OUTPATIENT)
Dept: FAMILY MEDICINE CLINIC | Facility: CLINIC | Age: 59
End: 2024-01-24

## 2024-01-24 VITALS
WEIGHT: 191 LBS | SYSTOLIC BLOOD PRESSURE: 185 MMHG | HEIGHT: 73 IN | HEART RATE: 76 BPM | DIASTOLIC BLOOD PRESSURE: 95 MMHG | BODY MASS INDEX: 25.31 KG/M2 | OXYGEN SATURATION: 98 %

## 2024-01-24 DIAGNOSIS — I10 ESSENTIAL (PRIMARY) HYPERTENSION: ICD-10-CM

## 2024-01-24 DIAGNOSIS — M25.551 PAIN IN RIGHT HIP: Primary | ICD-10-CM

## 2024-01-24 RX ORDER — TRIAMCINOLONE ACETONIDE 40 MG/ML
40 INJECTION, SUSPENSION INTRA-ARTICULAR; INTRAMUSCULAR ONCE
Status: COMPLETED | OUTPATIENT
Start: 2024-01-24 | End: 2024-01-24

## 2024-01-24 RX ORDER — MELOXICAM 15 MG/1
15 TABLET ORAL DAILY PRN
Qty: 30 TABLET | Refills: 1 | Status: SHIPPED | OUTPATIENT
Start: 2024-01-24

## 2024-01-24 RX ADMIN — TRIAMCINOLONE ACETONIDE 40 MG: 40 INJECTION, SUSPENSION INTRA-ARTICULAR; INTRAMUSCULAR at 08:45

## 2024-01-24 ASSESSMENT — ENCOUNTER SYMPTOMS
TROUBLE SWALLOWING: 0
ABDOMINAL DISTENTION: 0
SINUS PRESSURE: 0
EYE REDNESS: 0
NAUSEA: 0
APNEA: 0
RESPIRATORY NEGATIVE: 1
BLOOD IN STOOL: 0
ABDOMINAL PAIN: 0
CHEST TIGHTNESS: 0
VOICE CHANGE: 0
RECTAL PAIN: 0
RHINORRHEA: 0
EYE ITCHING: 0
BACK PAIN: 0
FACIAL SWELLING: 0
CONSTIPATION: 0
ALLERGIC/IMMUNOLOGIC NEGATIVE: 1
ANAL BLEEDING: 0
COLOR CHANGE: 0
STRIDOR: 0
GASTROINTESTINAL NEGATIVE: 1
VOMITING: 0
DIARRHEA: 0
EYES NEGATIVE: 1
PHOTOPHOBIA: 0
EYE PAIN: 0
SORE THROAT: 0
COUGH: 0

## 2024-01-24 ASSESSMENT — PATIENT HEALTH QUESTIONNAIRE - PHQ9
2. FEELING DOWN, DEPRESSED OR HOPELESS: 0
SUM OF ALL RESPONSES TO PHQ QUESTIONS 1-9: 0
1. LITTLE INTEREST OR PLEASURE IN DOING THINGS: 0
SUM OF ALL RESPONSES TO PHQ QUESTIONS 1-9: 0
SUM OF ALL RESPONSES TO PHQ9 QUESTIONS 1 & 2: 0

## 2024-01-24 NOTE — PROGRESS NOTES
PROGRESS NOTE    Chief Complaint   Patient presents with    Follow-up     Pain in right leg radiating from into the thigh.       SUBJECTIVE:     Chung Goldsmith is a very pleasant 58 y.o. male with hx of  hypertension, hyperlipidemia, anxiety, hypogonadism , seen today in office for complaints of right hip/leg pain.  He reports having the discomfort to his right hip about 1 month ago.  He was carrying groceries into the house and and as he stepped up his steps to go inside the house, he felt a sharp pain and discomfort.  Initially, he reports having more low back with radiation down his buttock to his right leg with also radiation to right anterior thigh.  Now, the discomfort has significantly improved over time and he only has the discomfort located to his right anterior thigh radiating down to his knee.  This is mainly aggravated whenever he sits or drives.  When he stands or straightening up, he reports relief.  He has tried some squats and some other stretches but feels that the squats aggravate the discomfort.  He has been taking 1 naproxen OTC per day with no relief.  He has not taking any of the muscle relaxer available due to concerns for side effects of drowsiness and does not like the associated side effects.  He reports no abdominal pain, no nausea, no vomiting no diarrhea, no incontinence of bladder or bowel function.    He reports compliance with his blood pressure medication but he has admitted that he has been eating out more.  His wife currently works third shift and 12 hours at a time, and she is not able to cook at home as much.  He also reports that he has been drinking more sodas.  He reports no chest pain, no shortness of breath no palpitation, no unilateral focal weakness, no edema, no orthopnea, no PND.      Past Medical History, Past Surgical History, Family history, Social History, and Medications were all reviewed with the patient today and updated as necessary.       Current

## 2024-02-08 ENCOUNTER — OFFICE VISIT (OUTPATIENT)
Dept: FAMILY MEDICINE CLINIC | Facility: CLINIC | Age: 59
End: 2024-02-08
Payer: COMMERCIAL

## 2024-02-08 VITALS
HEIGHT: 72 IN | WEIGHT: 186 LBS | BODY MASS INDEX: 25.19 KG/M2 | DIASTOLIC BLOOD PRESSURE: 82 MMHG | OXYGEN SATURATION: 97 % | SYSTOLIC BLOOD PRESSURE: 142 MMHG | HEART RATE: 57 BPM

## 2024-02-08 DIAGNOSIS — M25.551 PAIN IN RIGHT HIP: ICD-10-CM

## 2024-02-08 DIAGNOSIS — I10 ESSENTIAL HYPERTENSION: Primary | ICD-10-CM

## 2024-02-08 PROCEDURE — 3077F SYST BP >= 140 MM HG: CPT | Performed by: NURSE PRACTITIONER

## 2024-02-08 PROCEDURE — 3079F DIAST BP 80-89 MM HG: CPT | Performed by: NURSE PRACTITIONER

## 2024-02-08 PROCEDURE — 99214 OFFICE O/P EST MOD 30 MIN: CPT | Performed by: NURSE PRACTITIONER

## 2024-02-08 RX ORDER — LOSARTAN POTASSIUM 50 MG/1
50 TABLET ORAL DAILY
Qty: 90 TABLET | Refills: 3 | Status: SHIPPED | OUTPATIENT
Start: 2024-02-08

## 2024-02-08 RX ORDER — LOSARTAN POTASSIUM 25 MG/1
25 TABLET ORAL DAILY
Qty: 90 TABLET | Refills: 1 | Status: SHIPPED | OUTPATIENT
Start: 2024-02-08

## 2024-02-08 ASSESSMENT — ENCOUNTER SYMPTOMS
NAUSEA: 0
CONSTIPATION: 0
EYES NEGATIVE: 1
BLOOD IN STOOL: 0
ABDOMINAL PAIN: 0
RESPIRATORY NEGATIVE: 1
SORE THROAT: 0
ABDOMINAL DISTENTION: 0
BACK PAIN: 0
EYE REDNESS: 0
DIARRHEA: 0
ANAL BLEEDING: 0
SINUS PRESSURE: 0
EYE DISCHARGE: 0
PHOTOPHOBIA: 0
TROUBLE SWALLOWING: 0
CHOKING: 0
EYE PAIN: 0
STRIDOR: 0
ALLERGIC/IMMUNOLOGIC NEGATIVE: 1
APNEA: 0
RHINORRHEA: 0
GASTROINTESTINAL NEGATIVE: 1
CHEST TIGHTNESS: 0
RECTAL PAIN: 0
COUGH: 0
COLOR CHANGE: 0
WHEEZING: 0
SINUS PAIN: 0
SHORTNESS OF BREATH: 0
VOICE CHANGE: 0
VOMITING: 0
FACIAL SWELLING: 0
EYE ITCHING: 0

## 2024-02-08 NOTE — PROGRESS NOTES
have spent 30 minutes reviewing previous notes, test results and face to face with the patient discussing the diagnosis and importance of compliance with the treatment plan as well as documenting on the day of the visit. Greater than 50% of this visit was spent counseling the patient about test results, prognosis, importance of compliance, education about disease process, benefits of medications, instructions for management of acute symptoms, and follow up plans.    Return for keep appt for 2/16/24 .     Marie Tanner, GILL - CNP

## 2024-02-16 ENCOUNTER — OFFICE VISIT (OUTPATIENT)
Dept: FAMILY MEDICINE CLINIC | Facility: CLINIC | Age: 59
End: 2024-02-16
Payer: COMMERCIAL

## 2024-02-16 VITALS
WEIGHT: 186 LBS | HEIGHT: 72 IN | DIASTOLIC BLOOD PRESSURE: 88 MMHG | OXYGEN SATURATION: 98 % | BODY MASS INDEX: 25.19 KG/M2 | HEART RATE: 61 BPM | SYSTOLIC BLOOD PRESSURE: 136 MMHG

## 2024-02-16 DIAGNOSIS — E29.1 HYPOGONADISM IN MALE: ICD-10-CM

## 2024-02-16 DIAGNOSIS — E55.9 VITAMIN D DEFICIENCY: ICD-10-CM

## 2024-02-16 DIAGNOSIS — I10 ESSENTIAL HYPERTENSION: Primary | ICD-10-CM

## 2024-02-16 DIAGNOSIS — E78.00 PURE HYPERCHOLESTEROLEMIA: ICD-10-CM

## 2024-02-16 PROCEDURE — 3075F SYST BP GE 130 - 139MM HG: CPT | Performed by: NURSE PRACTITIONER

## 2024-02-16 PROCEDURE — 99214 OFFICE O/P EST MOD 30 MIN: CPT | Performed by: NURSE PRACTITIONER

## 2024-02-16 PROCEDURE — 3079F DIAST BP 80-89 MM HG: CPT | Performed by: NURSE PRACTITIONER

## 2024-02-16 RX ORDER — SILDENAFIL 100 MG/1
TABLET, FILM COATED ORAL
Qty: 30 TABLET | Refills: 11 | Status: SHIPPED | OUTPATIENT
Start: 2024-02-16

## 2024-02-16 RX ORDER — TESTOSTERONE CYPIONATE 200 MG/ML
INJECTION, SOLUTION INTRAMUSCULAR
Qty: 10 ML | Refills: 1 | Status: SHIPPED | OUTPATIENT
Start: 2024-02-16 | End: 2024-08-14

## 2024-02-16 NOTE — PROGRESS NOTES
PROGRESS NOTE    Chief Complaint   Patient presents with    Follow-up     Rechecking BP after medication adjustment.       SUBJECTIVE:     Chung Goldsmith is a very pleasant 58 y.o. male with hx of   hypertension, hyperlipidemia, anxiety, hypogonadism  , seen today in office for blood pressure medication management.  Dose of losartan was increased to 75 mg last office visit about 2 weeks prior.  He reports doing well and has had no side effects, no dizziness, no fatigue.  Reports feeling well overall.  He is also currently taking testosterone replacement therapy and reports doing well with regimen.  No side effects.      Past Medical History, Past Surgical History, Family history, Social History, and Medications were all reviewed with the patient today and updated as necessary.       Current Outpatient Medications   Medication Sig Dispense Refill    testosterone cypionate (DEPOTESTOTERONE CYPIONATE) 200 MG/ML injection INJECT 1ML INTRAMUSCULAR ROUTE EVERY 21 DAYS 10 mL 1    sildenafil (VIAGRA) 100 MG tablet TAKE ONE TABLET BY MOUTH ONE TIME DAILY AS NEEDED 30 tablet 11    losartan (COZAAR) 25 MG tablet Take 1 tablet by mouth daily In addition to the losartan 50 mg to total 75 mg per day for blood pressure 90 tablet 1    losartan (COZAAR) 50 MG tablet Take 1 tablet by mouth daily In addition to the losartan 25 mg to total 75 mg per day for blood pressure 90 tablet 3    meloxicam (MOBIC) 15 MG tablet Take 1 tablet by mouth daily as needed for Pain Take with food 30 tablet 1    atenolol (TENORMIN) 50 MG tablet Take 1 tablet by mouth daily For blood pressure 90 tablet 3    ergocalciferol (ERGOCALCIFEROL) 1.25 MG (11925 UT) capsule Take 1 capsule by mouth every 14 days With food for vitamin D supplementation 8 capsule 3    cyclobenzaprine (FLEXERIL) 10 MG tablet TAKE ONE-HALF TO ONE TABLET BY MOUTH EVERY EVENING AS NEEDED FOR MUSCLE SPASM 30 tablet 5    hydrOXYzine (ATARAX) 25 MG tablet Take 1/2 to 1 tablet

## 2024-03-24 DIAGNOSIS — I10 ESSENTIAL HYPERTENSION: ICD-10-CM

## 2024-03-25 ENCOUNTER — TELEPHONE (OUTPATIENT)
Dept: FAMILY MEDICINE CLINIC | Facility: CLINIC | Age: 59
End: 2024-03-25

## 2024-03-25 RX ORDER — ATENOLOL 50 MG/1
TABLET ORAL
Qty: 90 TABLET | Refills: 3 | Status: SHIPPED | OUTPATIENT
Start: 2024-03-25

## 2024-03-25 RX ORDER — LOSARTAN POTASSIUM 50 MG/1
TABLET ORAL
Qty: 90 TABLET | Refills: 3 | Status: SHIPPED | OUTPATIENT
Start: 2024-03-25

## 2024-04-03 DIAGNOSIS — E55.9 VITAMIN D DEFICIENCY: ICD-10-CM

## 2024-04-04 RX ORDER — ERGOCALCIFEROL 1.25 MG/1
CAPSULE ORAL
Qty: 8 CAPSULE | Refills: 3 | Status: SHIPPED | OUTPATIENT
Start: 2024-04-04

## 2024-04-19 DIAGNOSIS — E55.9 VITAMIN D DEFICIENCY: ICD-10-CM

## 2024-04-19 RX ORDER — ERGOCALCIFEROL 1.25 MG/1
CAPSULE ORAL
Qty: 8 CAPSULE | Refills: 3 | Status: SHIPPED | OUTPATIENT
Start: 2024-04-19

## 2024-04-21 DIAGNOSIS — M25.551 PAIN IN RIGHT HIP: ICD-10-CM

## 2024-04-22 RX ORDER — MELOXICAM 15 MG/1
TABLET ORAL
Qty: 30 TABLET | Refills: 1 | Status: SHIPPED | OUTPATIENT
Start: 2024-04-22

## 2024-05-12 DIAGNOSIS — E29.1 HYPOGONADISM IN MALE: Primary | ICD-10-CM

## 2024-05-13 ENCOUNTER — NURSE ONLY (OUTPATIENT)
Dept: FAMILY MEDICINE CLINIC | Facility: CLINIC | Age: 59
End: 2024-05-13

## 2024-05-13 DIAGNOSIS — E29.1 HYPOGONADISM IN MALE: ICD-10-CM

## 2024-05-13 DIAGNOSIS — E55.9 VITAMIN D DEFICIENCY: ICD-10-CM

## 2024-05-13 DIAGNOSIS — E78.00 PURE HYPERCHOLESTEROLEMIA: ICD-10-CM

## 2024-05-13 DIAGNOSIS — I10 ESSENTIAL HYPERTENSION: ICD-10-CM

## 2024-05-13 LAB
25(OH)D3 SERPL-MCNC: 37.4 NG/ML (ref 30–100)
ALBUMIN SERPL-MCNC: 4.3 G/DL (ref 3.2–4.6)
ALBUMIN/GLOB SERPL: 1.3 (ref 1–1.9)
ALP SERPL-CCNC: 68 U/L (ref 40–129)
ALT SERPL-CCNC: 29 U/L (ref 12–65)
ANION GAP SERPL CALC-SCNC: 10 MMOL/L (ref 9–18)
AST SERPL-CCNC: 29 U/L (ref 15–37)
BASOPHILS # BLD: 0 K/UL (ref 0–0.2)
BASOPHILS NFR BLD: 0 % (ref 0–2)
BILIRUB SERPL-MCNC: 0.6 MG/DL (ref 0–1.2)
BUN SERPL-MCNC: 31 MG/DL (ref 8–23)
CALCIUM SERPL-MCNC: 9.5 MG/DL (ref 8.8–10.2)
CHLORIDE SERPL-SCNC: 104 MMOL/L (ref 98–107)
CHOLEST SERPL-MCNC: 201 MG/DL (ref 0–200)
CO2 SERPL-SCNC: 26 MMOL/L (ref 20–28)
CREAT SERPL-MCNC: 1.25 MG/DL (ref 0.8–1.3)
DIFFERENTIAL METHOD BLD: ABNORMAL
EOSINOPHIL # BLD: 0 K/UL (ref 0–0.8)
EOSINOPHIL NFR BLD: 0 % (ref 0.5–7.8)
ERYTHROCYTE [DISTWIDTH] IN BLOOD BY AUTOMATED COUNT: 13.2 % (ref 11.9–14.6)
GLOBULIN SER CALC-MCNC: 3.2 G/DL (ref 2.3–3.5)
GLUCOSE SERPL-MCNC: 97 MG/DL (ref 70–99)
HCT VFR BLD AUTO: 48.8 % (ref 41.1–50.3)
HDLC SERPL-MCNC: 61 MG/DL (ref 40–60)
HDLC SERPL: 3.3 (ref 0–5)
HGB BLD-MCNC: 16.2 G/DL (ref 13.6–17.2)
IMM GRANULOCYTES # BLD AUTO: 0 K/UL (ref 0–0.5)
IMM GRANULOCYTES NFR BLD AUTO: 0 % (ref 0–5)
LDLC SERPL CALC-MCNC: 126 MG/DL (ref 0–100)
LYMPHOCYTES # BLD: 2.5 K/UL (ref 0.5–4.6)
LYMPHOCYTES NFR BLD: 34 % (ref 13–44)
MCH RBC QN AUTO: 31 PG (ref 26.1–32.9)
MCHC RBC AUTO-ENTMCNC: 33.2 G/DL (ref 31.4–35)
MCV RBC AUTO: 93.5 FL (ref 82–102)
MONOCYTES # BLD: 0.8 K/UL (ref 0.1–1.3)
MONOCYTES NFR BLD: 10 % (ref 4–12)
NEUTS SEG # BLD: 4.1 K/UL (ref 1.7–8.2)
NEUTS SEG NFR BLD: 56 % (ref 43–78)
NRBC # BLD: 0 K/UL (ref 0–0.2)
PLATELET # BLD AUTO: 173 K/UL (ref 150–450)
PMV BLD AUTO: 10.3 FL (ref 9.4–12.3)
POTASSIUM SERPL-SCNC: 4.3 MMOL/L (ref 3.5–5.1)
PROT SERPL-MCNC: 7.5 G/DL (ref 6.3–8.2)
PSA SERPL-MCNC: 3.7 NG/ML (ref 0–4)
RBC # BLD AUTO: 5.22 M/UL (ref 4.23–5.6)
SODIUM SERPL-SCNC: 140 MMOL/L (ref 136–145)
TRIGL SERPL-MCNC: 71 MG/DL (ref 0–150)
TSH W FREE THYROID IF ABNORMAL: 1.88 UIU/ML (ref 0.27–4.2)
VLDLC SERPL CALC-MCNC: 14 MG/DL (ref 6–23)
WBC # BLD AUTO: 7.4 K/UL (ref 4.3–11.1)

## 2024-05-18 LAB
TESTOST FREE SERPL-MCNC: 6.9 PG/ML (ref 6.6–18.1)
TESTOST SERPL-MCNC: 449 NG/DL (ref 264–916)

## 2024-05-21 ENCOUNTER — TELEMEDICINE (OUTPATIENT)
Dept: FAMILY MEDICINE CLINIC | Facility: CLINIC | Age: 59
End: 2024-05-21
Payer: COMMERCIAL

## 2024-05-21 DIAGNOSIS — E78.00 PURE HYPERCHOLESTEROLEMIA: ICD-10-CM

## 2024-05-21 DIAGNOSIS — F41.1 GENERALIZED ANXIETY DISORDER: ICD-10-CM

## 2024-05-21 DIAGNOSIS — E29.1 HYPOGONADISM IN MALE: ICD-10-CM

## 2024-05-21 DIAGNOSIS — I10 ESSENTIAL HYPERTENSION: Primary | ICD-10-CM

## 2024-05-21 DIAGNOSIS — E55.9 VITAMIN D DEFICIENCY: ICD-10-CM

## 2024-05-21 PROCEDURE — 99214 OFFICE O/P EST MOD 30 MIN: CPT | Performed by: NURSE PRACTITIONER

## 2024-05-21 RX ORDER — LOSARTAN POTASSIUM 50 MG/1
TABLET ORAL
Qty: 90 TABLET | Refills: 3 | Status: SHIPPED | OUTPATIENT
Start: 2024-05-21

## 2024-05-21 RX ORDER — LOSARTAN POTASSIUM 25 MG/1
25 TABLET ORAL DAILY
Qty: 90 TABLET | Refills: 1 | Status: SHIPPED | OUTPATIENT
Start: 2024-05-21

## 2024-05-21 RX ORDER — HYDROXYZINE HYDROCHLORIDE 25 MG/1
TABLET, FILM COATED ORAL
Qty: 60 TABLET | Refills: 5 | Status: SHIPPED | OUTPATIENT
Start: 2024-05-21

## 2024-05-21 ASSESSMENT — ENCOUNTER SYMPTOMS
EYE PAIN: 0
RHINORRHEA: 0
SHORTNESS OF BREATH: 0
EYE REDNESS: 0
ANAL BLEEDING: 0
ABDOMINAL DISTENTION: 0
EYE ITCHING: 0
WHEEZING: 0
TROUBLE SWALLOWING: 0
CONSTIPATION: 0
APNEA: 0
EYES NEGATIVE: 1
VOMITING: 0
COUGH: 0
VOICE CHANGE: 0
COLOR CHANGE: 0
FACIAL SWELLING: 0
DIARRHEA: 0
SORE THROAT: 0
SINUS PAIN: 0
RECTAL PAIN: 0
STRIDOR: 0
ABDOMINAL PAIN: 0
BLOOD IN STOOL: 0
EYE DISCHARGE: 0
PHOTOPHOBIA: 0
CHEST TIGHTNESS: 0
ALLERGIC/IMMUNOLOGIC NEGATIVE: 1
RESPIRATORY NEGATIVE: 1
CHOKING: 0
SINUS PRESSURE: 0
NAUSEA: 0
BACK PAIN: 0
GASTROINTESTINAL NEGATIVE: 1

## 2024-05-21 ASSESSMENT — PATIENT HEALTH QUESTIONNAIRE - PHQ9
2. FEELING DOWN, DEPRESSED OR HOPELESS: NOT AT ALL
SUM OF ALL RESPONSES TO PHQ QUESTIONS 1-9: 0
SUM OF ALL RESPONSES TO PHQ9 QUESTIONS 1 & 2: 0
1. LITTLE INTEREST OR PLEASURE IN DOING THINGS: NOT AT ALL
SUM OF ALL RESPONSES TO PHQ QUESTIONS 1-9: 0

## 2024-05-21 NOTE — PROGRESS NOTES
PROGRESS NOTE        Consent:    Chung Goldsmith, was evaluated through a synchronous (real-time) audio-video encounter. The patient (or guardian if applicable) is aware that this is a billable service, which includes applicable co-pays. This Virtual Visit was conducted with patient's (and/or legal guardian's) consent. Patient identification was verified, and a caregiver was present when appropriate.   The patient was located at Home: [address] [place] [id number]  Provider was located at Other: [place], [place], [place]  Confirm you are appropriately licensed, registered, or certified to deliver care in the state where the patient is located as indicated above. If you are not or unsure, please re-schedule the visit: Yes, I confirm.        On this date 5/21/2024 I have spent 31 minutes reviewing previous notes, test results and face to face (virtual) with the patient discussing the diagnosis and importance of compliance with the treatment plan as well as documenting on the day of the visit.. Greater than 50% of this visit was spent counseling the patient about test results, prognosis, importance of compliance, education about disease process, benefits of medications, instructions for management of acute symptoms, and follow up plans.      Chief Complaint   Patient presents with    Discuss Labs     6 months labs review.        SUBJECTIVE:     Chung Goldsmith is a very pleasant 58 y.o. male , with past medical history significant for  hypertension, hyperlipidemia, anxiety, hypogonadism , seen virtually regarding lab results review and follow-up.  Patient reports doing well and feeling well overall.  He reports compliance with current medication regimen and has had no side effects.  Reports feeling great.      Past Medical History, Past Surgical History, Family history, Social History, and Medications were all reviewed with the patient today and updated as necessary.       Current Outpatient Medications

## 2024-08-14 DIAGNOSIS — M25.551 PAIN IN RIGHT HIP: ICD-10-CM

## 2024-08-16 RX ORDER — MELOXICAM 15 MG/1
TABLET ORAL
Qty: 30 TABLET | Refills: 1 | Status: SHIPPED | OUTPATIENT
Start: 2024-08-16

## 2024-09-17 DIAGNOSIS — E29.1 HYPOGONADISM IN MALE: ICD-10-CM

## 2024-09-17 RX ORDER — TESTOSTERONE CYPIONATE 200 MG/ML
INJECTION, SOLUTION INTRAMUSCULAR
Qty: 10 ML | Refills: 1 | Status: SHIPPED | OUTPATIENT
Start: 2024-09-17 | End: 2025-03-16

## 2024-11-11 ENCOUNTER — LAB (OUTPATIENT)
Dept: FAMILY MEDICINE CLINIC | Facility: CLINIC | Age: 59
End: 2024-11-11

## 2024-11-11 DIAGNOSIS — E78.00 PURE HYPERCHOLESTEROLEMIA: ICD-10-CM

## 2024-11-11 DIAGNOSIS — E55.9 VITAMIN D DEFICIENCY: ICD-10-CM

## 2024-11-11 DIAGNOSIS — I10 ESSENTIAL HYPERTENSION: ICD-10-CM

## 2024-11-11 DIAGNOSIS — E29.1 HYPOGONADISM IN MALE: ICD-10-CM

## 2024-11-11 LAB
BASOPHILS # BLD: 0 K/UL (ref 0–0.2)
BASOPHILS NFR BLD: 0 % (ref 0–2)
BILIRUBIN, URINE, POC: NEGATIVE
BLOOD URINE, POC: NEGATIVE
DIFFERENTIAL METHOD BLD: ABNORMAL
EOSINOPHIL # BLD: 0 K/UL (ref 0–0.8)
EOSINOPHIL NFR BLD: 0 % (ref 0.5–7.8)
ERYTHROCYTE [DISTWIDTH] IN BLOOD BY AUTOMATED COUNT: 13.4 % (ref 11.9–14.6)
GLUCOSE URINE, POC: NEGATIVE
HCT VFR BLD AUTO: 49.6 % (ref 41.1–50.3)
HGB BLD-MCNC: 16.4 G/DL (ref 13.6–17.2)
IMM GRANULOCYTES # BLD AUTO: 0 K/UL (ref 0–0.5)
IMM GRANULOCYTES NFR BLD AUTO: 0 % (ref 0–5)
KETONES, URINE, POC: NEGATIVE
LEUKOCYTE ESTERASE, URINE, POC: NEGATIVE
LYMPHOCYTES # BLD: 2 K/UL (ref 0.5–4.6)
LYMPHOCYTES NFR BLD: 30 % (ref 13–44)
MCH RBC QN AUTO: 31.6 PG (ref 26.1–32.9)
MCHC RBC AUTO-ENTMCNC: 33.1 G/DL (ref 31.4–35)
MCV RBC AUTO: 95.6 FL (ref 82–102)
MONOCYTES # BLD: 0.5 K/UL (ref 0.1–1.3)
MONOCYTES NFR BLD: 7 % (ref 4–12)
NEUTS SEG # BLD: 4.1 K/UL (ref 1.7–8.2)
NEUTS SEG NFR BLD: 63 % (ref 43–78)
NITRITE, URINE, POC: NEGATIVE
NRBC # BLD: 0 K/UL (ref 0–0.2)
PH, URINE, POC: 6 (ref 4.6–8)
PLATELET # BLD AUTO: 189 K/UL (ref 150–450)
PMV BLD AUTO: 10.1 FL (ref 9.4–12.3)
PROTEIN,URINE, POC: NEGATIVE
RBC # BLD AUTO: 5.19 M/UL (ref 4.23–5.6)
SPECIFIC GRAVITY, URINE, POC: 1.02 (ref 1–1.03)
URINALYSIS CLARITY, POC: CLEAR
URINALYSIS COLOR, POC: YELLOW
UROBILINOGEN, POC: NORMAL
WBC # BLD AUTO: 6.7 K/UL (ref 4.3–11.1)

## 2024-11-12 LAB
25(OH)D3 SERPL-MCNC: 34.5 NG/ML (ref 30–100)
ALBUMIN SERPL-MCNC: 4 G/DL (ref 3.2–4.6)
ALBUMIN/GLOB SERPL: 1.1 (ref 1–1.9)
ALP SERPL-CCNC: 81 U/L (ref 40–129)
ALT SERPL-CCNC: 32 U/L (ref 8–55)
ANION GAP SERPL CALC-SCNC: 8 MMOL/L (ref 7–16)
AST SERPL-CCNC: 23 U/L (ref 15–37)
BILIRUB SERPL-MCNC: 0.5 MG/DL (ref 0–1.2)
BUN SERPL-MCNC: 20 MG/DL (ref 8–23)
CALCIUM SERPL-MCNC: 9.3 MG/DL (ref 8.8–10.2)
CHLORIDE SERPL-SCNC: 104 MMOL/L (ref 98–107)
CHOLEST SERPL-MCNC: 197 MG/DL (ref 0–200)
CO2 SERPL-SCNC: 29 MMOL/L (ref 20–29)
CREAT SERPL-MCNC: 1.09 MG/DL (ref 0.8–1.3)
GLOBULIN SER CALC-MCNC: 3.6 G/DL (ref 2.3–3.5)
GLUCOSE SERPL-MCNC: 101 MG/DL (ref 70–99)
HDLC SERPL-MCNC: 62 MG/DL (ref 40–60)
HDLC SERPL: 3.2 (ref 0–5)
LDLC SERPL CALC-MCNC: 119 MG/DL (ref 0–100)
POTASSIUM SERPL-SCNC: 4.6 MMOL/L (ref 3.5–5.1)
PROT SERPL-MCNC: 7.5 G/DL (ref 6.3–8.2)
PSA SERPL-MCNC: 5.9 NG/ML (ref 0–4)
SODIUM SERPL-SCNC: 141 MMOL/L (ref 136–145)
TRIGL SERPL-MCNC: 80 MG/DL (ref 0–150)
TSH W FREE THYROID IF ABNORMAL: 1.46 UIU/ML (ref 0.27–4.2)
VLDLC SERPL CALC-MCNC: 16 MG/DL (ref 6–23)

## 2024-11-15 LAB
TESTOST FREE SERPL-MCNC: 10.6 PG/ML (ref 6.6–18.1)
TESTOST SERPL-MCNC: 530 NG/DL (ref 264–916)

## 2024-11-21 ENCOUNTER — OFFICE VISIT (OUTPATIENT)
Dept: FAMILY MEDICINE CLINIC | Facility: CLINIC | Age: 59
End: 2024-11-21

## 2024-11-21 VITALS
HEART RATE: 78 BPM | WEIGHT: 173.6 LBS | OXYGEN SATURATION: 99 % | TEMPERATURE: 97.5 F | SYSTOLIC BLOOD PRESSURE: 144 MMHG | BODY MASS INDEX: 23.51 KG/M2 | HEIGHT: 72 IN | RESPIRATION RATE: 15 BRPM | DIASTOLIC BLOOD PRESSURE: 76 MMHG

## 2024-11-21 DIAGNOSIS — M25.551 PAIN IN RIGHT HIP: ICD-10-CM

## 2024-11-21 DIAGNOSIS — E29.1 HYPOGONADISM IN MALE: ICD-10-CM

## 2024-11-21 DIAGNOSIS — I10 ESSENTIAL HYPERTENSION: ICD-10-CM

## 2024-11-21 DIAGNOSIS — R97.20 ELEVATED PSA: ICD-10-CM

## 2024-11-21 DIAGNOSIS — N41.9 PROSTATITIS, UNSPECIFIED PROSTATITIS TYPE: Primary | ICD-10-CM

## 2024-11-21 RX ORDER — SILDENAFIL 100 MG/1
TABLET, FILM COATED ORAL
Qty: 30 TABLET | Refills: 11 | Status: SHIPPED | OUTPATIENT
Start: 2024-11-21

## 2024-11-21 RX ORDER — MELOXICAM 15 MG/1
TABLET ORAL
Qty: 30 TABLET | Refills: 5 | Status: SHIPPED | OUTPATIENT
Start: 2024-11-21 | End: 2024-12-06 | Stop reason: ALTCHOICE

## 2024-11-21 RX ORDER — DOXYCYCLINE 100 MG/1
100 TABLET ORAL 2 TIMES DAILY
Qty: 28 TABLET | Refills: 0 | Status: SHIPPED | OUTPATIENT
Start: 2024-11-21 | End: 2024-12-06

## 2024-11-21 RX ORDER — ATENOLOL 50 MG/1
50 TABLET ORAL DAILY
Qty: 90 TABLET | Refills: 3 | Status: SHIPPED | OUTPATIENT
Start: 2024-11-21

## 2024-11-21 RX ORDER — LOSARTAN POTASSIUM 50 MG/1
TABLET ORAL
Qty: 90 TABLET | Refills: 3 | Status: CANCELLED | OUTPATIENT
Start: 2024-11-21

## 2024-11-21 RX ORDER — LOSARTAN POTASSIUM 25 MG/1
25 TABLET ORAL DAILY
Qty: 90 TABLET | Refills: 1 | Status: CANCELLED | OUTPATIENT
Start: 2024-11-21

## 2024-11-21 RX ORDER — LOSARTAN POTASSIUM 100 MG/1
100 TABLET ORAL DAILY
Qty: 90 TABLET | Refills: 1 | Status: SHIPPED | OUTPATIENT
Start: 2024-11-21

## 2024-11-21 RX ORDER — TESTOSTERONE CYPIONATE 200 MG/ML
INJECTION, SOLUTION INTRAMUSCULAR
Qty: 10 ML | Refills: 1 | Status: SHIPPED | OUTPATIENT
Start: 2024-11-21 | End: 2025-05-20

## 2024-11-21 ASSESSMENT — PATIENT HEALTH QUESTIONNAIRE - PHQ9
1. LITTLE INTEREST OR PLEASURE IN DOING THINGS: NOT AT ALL
SUM OF ALL RESPONSES TO PHQ QUESTIONS 1-9: 0
SUM OF ALL RESPONSES TO PHQ9 QUESTIONS 1 & 2: 0
SUM OF ALL RESPONSES TO PHQ QUESTIONS 1-9: 0
2. FEELING DOWN, DEPRESSED OR HOPELESS: NOT AT ALL

## 2024-11-29 NOTE — PROGRESS NOTES
NEW PATIENT INTAKE    Referral Diagnosis: Elevated PSA    Referring Provider:  Marie Tanner APRN - CNP    Primary Care Provider: Marie Tanner APRN - CNP    Family History of Cancer/ Hematology Disorders: Mother and father with lung cancer; sister with cancerous growth in her back     Presenting Symptoms:  No relevant physical symptoms reported     Chronological History of Pertinent Events:  Mr. Goldsmith is a 59-year-old black male referred for elevated PSA. PMH is significant for hypogonadism. Pt is currently on testosterone replacement therapy. He is prescribed Viagra for ED. No other  history documented.     9/14/20: PSA 3.2 (Epic/Labs)     11/9/20: PSA 3.8   -U/A WNL (Epic/Labs)    7/27/21: PSA 2.6 (Epic/Labs)    12/8/21: PSA 2.7 (Epic/Labs)    7/14/22: PSA 3.1 (Epic/Labs)    1/31/23: PSA 3.9 (Epic/Labs)    9/28/23: PSA 4.5 (Epic/Labs)    11/6/23: PSA 4.1; Free PSA 0.6; Free % PSA 14.6   -U/A WNL (Epic/Labs)    5/13/24: PSA 3.7 (Epic/Labs)    11/11/24: Routine labs drawn in advance of primary care f/u appt (scheduled for 12/5/24)   -PSA 5.9   -U/A WNL (Epic/Labs)    11/21/24: Referred to [facility]    Notes from Referring Provider: None    Presented at Tumor Board: N/A    Other Pertinent Information: None

## 2024-12-02 ASSESSMENT — ENCOUNTER SYMPTOMS
CONSTIPATION: 0
STRIDOR: 0
ALLERGIC/IMMUNOLOGIC NEGATIVE: 1
PHOTOPHOBIA: 0
CHEST TIGHTNESS: 0
SHORTNESS OF BREATH: 0
SINUS PAIN: 0
GASTROINTESTINAL NEGATIVE: 1
EYE DISCHARGE: 0
WHEEZING: 0
FACIAL SWELLING: 0
RECTAL PAIN: 0
NAUSEA: 0
RHINORRHEA: 0
CHOKING: 0
RESPIRATORY NEGATIVE: 1
VOMITING: 0
SORE THROAT: 0
VOICE CHANGE: 0
BACK PAIN: 0
SINUS PRESSURE: 0
COLOR CHANGE: 0
APNEA: 0
ABDOMINAL PAIN: 0
ANAL BLEEDING: 0
DIARRHEA: 0
EYE ITCHING: 0
TROUBLE SWALLOWING: 0
BLOOD IN STOOL: 0
EYE REDNESS: 0
EYES NEGATIVE: 1
ABDOMINAL DISTENTION: 0
EYE PAIN: 0
COUGH: 0

## 2024-12-02 NOTE — PROGRESS NOTES
He is not ill-appearing, toxic-appearing or diaphoretic.   HENT:      Head: Normocephalic and atraumatic.   Neck:      Vascular: No carotid bruit.   Cardiovascular:      Rate and Rhythm: Normal rate and regular rhythm.      Pulses: Normal pulses.      Heart sounds: Normal heart sounds.   Pulmonary:      Effort: Pulmonary effort is normal.      Breath sounds: Normal breath sounds.   Abdominal:      General: Abdomen is flat. Bowel sounds are normal.      Palpations: Abdomen is soft.   Genitourinary:     Comments: Deferred due to concerns for possible acute prostatitis with recent PSA elevation of 5.9.   Musculoskeletal:      Cervical back: Normal range of motion and neck supple. No rigidity or tenderness.   Lymphadenopathy:      Cervical: No cervical adenopathy.   Neurological:      General: No focal deficit present.      Mental Status: He is alert and oriented to person, place, and time.   Psychiatric:         Mood and Affect: Mood normal.         Behavior: Behavior normal.         Thought Content: Thought content normal.         Judgment: Judgment normal.           Medical problems and test results were reviewed with the patient today.     Lab Results   Component Value Date     11/11/2024    K 4.6 11/11/2024     11/11/2024    CO2 29 11/11/2024    BUN 20 11/11/2024    CREATININE 1.09 11/11/2024    GLUCOSE 101 (H) 11/11/2024    CALCIUM 9.3 11/11/2024    BILITOT 0.5 11/11/2024    ALKPHOS 81 11/11/2024    AST 23 11/11/2024    ALT 32 11/11/2024    LABGLOM 77 11/11/2024    GFRAA >60 07/14/2022    AGRATIO 1.8 12/08/2021    GLOB 3.6 (H) 11/11/2024       Lab Results   Component Value Date    WBC 6.7 11/11/2024    HGB 16.4 11/11/2024    HCT 49.6 11/11/2024    MCV 95.6 11/11/2024     11/11/2024     Lab Results   Component Value Date    CHOL 197 11/11/2024    CHOL 201 (H) 05/13/2024    CHOL 206 (H) 09/28/2023     Lab Results   Component Value Date    TRIG 80 11/11/2024    TRIG 71 05/13/2024    TRIG 98

## 2024-12-04 ENCOUNTER — OFFICE VISIT (OUTPATIENT)
Dept: ONCOLOGY | Age: 59
End: 2024-12-04
Payer: COMMERCIAL

## 2024-12-04 VITALS
WEIGHT: 172 LBS | RESPIRATION RATE: 16 BRPM | HEART RATE: 56 BPM | OXYGEN SATURATION: 99 % | TEMPERATURE: 97.5 F | SYSTOLIC BLOOD PRESSURE: 155 MMHG | HEIGHT: 72 IN | BODY MASS INDEX: 23.3 KG/M2 | DIASTOLIC BLOOD PRESSURE: 71 MMHG

## 2024-12-04 DIAGNOSIS — R97.20 ELEVATED PSA: Primary | ICD-10-CM

## 2024-12-04 DIAGNOSIS — R97.20 ELEVATED PSA: ICD-10-CM

## 2024-12-04 LAB — PSA SERPL-MCNC: 5.3 NG/ML (ref 0–4)

## 2024-12-04 PROCEDURE — 99203 OFFICE O/P NEW LOW 30 MIN: CPT | Performed by: PHYSICIAN ASSISTANT

## 2024-12-04 ASSESSMENT — PATIENT HEALTH QUESTIONNAIRE - PHQ9
SUM OF ALL RESPONSES TO PHQ QUESTIONS 1-9: 2
2. FEELING DOWN, DEPRESSED OR HOPELESS: SEVERAL DAYS
1. LITTLE INTEREST OR PLEASURE IN DOING THINGS: SEVERAL DAYS
SUM OF ALL RESPONSES TO PHQ9 QUESTIONS 1 & 2: 2

## 2024-12-04 NOTE — PROGRESS NOTES
Urologic Oncology  [facility]  [address] [place] [id number]  [phone]          Chung Goldsmith  : 1965      INITIAL EVALUATION    Chief Complaint   Patient presents with    New Patient       HPI:     Chung Goldsmith is a 59 y.o. male who is referred for evaluation of elevated PSA.  He had routine screening PSA on 2024 that was elevated at 5.9.  This had previously been 3.7 on 2024.  He has had prior elevation to 4.5 in 2023.    Patient denies any significant lower urinary tract symptoms.  He feels that he empties his bladder without significant retention and voids with good stream.  No dysuria or hematuria.  Gets up approximately 2 times per night to void.    Patient believes he has a family history of prostate cancer in an uncle however no first-degree relatives with history of prostate cancer.    Patient's other medical comorbidities include high blood pressure.  He does not take any blood thinners.  He does use testosterone replacement.      PMH:     Past Medical History:   Diagnosis Date    Chronic pain 2014    back pain and right buttocks, leg , foot    HNP (herniated nucleus pulposus), lumbar     Hypertension        PSH:    Past Surgical History:   Procedure Laterality Date    COLONOSCOPY N/A 10/13/2016    COLONOSCOPY/ 27 performed by Guido Mcgowan MD at [facility] END[facility]COPY    COLONOSCOPY N/A 2022    COLORECTAL CANCER SCREENING, NOT HIGH RISK performed by Guido Mcgowan MD at [facility] ENDOSCOPY    COLONOSCOPY,MAXIMUS LOPEZ,FORCEP/CAUTERY  10/13/2016         LUMBAR LAMINECTOMY      x 2       MEDs:    Current Outpatient Medications   Medication Sig Dispense Refill    sildenafil (VIAGRA) 100 MG tablet TAKE ONE TABLET BY MOUTH ONE TIME DAILY AS NEEDED 30 tablet 11    atenolol (TENORMIN) 50 MG tablet Take 1 tablet by mouth daily 90 tablet 3    testosterone cypionate (DEPOTESTOTERONE CYPIONATE) 200 MG/ML injection INJECT 1ML INTRAMUSCULARLY

## 2024-12-04 NOTE — PATIENT INSTRUCTIONS
Granulocytes Absolute 11/11/2024 0.0  0.0 - 0.5 K/UL Final    Testosterone 11/11/2024 530  264 - 916 ng/dL Final    Comment: (NOTE)  Adult male reference interval is based on a population of  healthy nonobese males (BMI <30) between 19 and 39 years old.  luz Cristina.al. JCEM 2017,102;9449-2760. PMID: [id number].  Performed At:  [facility] [id number]  Isaias Charles MD Ph:[phone]      Testosterone, Free 11/11/2024 10.6  6.6 - 18.1 pg/mL Final    Comment: (NOTE)  Performed At:  [facility] [id number]  Isaias Charles MD Ph:[phone]                 Please refer to After Visit Summary or Booster for upcoming appointment information. Please call our office for rescheduling needs at least 24 hours before your scheduled appointment time.If you have any questions regarding your upcoming schedule please reach out to your care team through Booster or call [phone].     Please notify your assigned Nurse Navigator of any unplanned hospital admissions or Emergency Room visits within 24 hours of discharge.    -------------------------------------------------------------------------------------------------------------------  Please call our office at [phone] if you have any  of the following symptoms:   Fever of 100.5 or greater  Chills  Shortness of breath  Swelling or pain in one leg    After office hours an answering service is available and will contact a provider for emergencies or if you are experiencing any of the above symptoms.        Caron Hylton MA

## 2024-12-06 ENCOUNTER — OFFICE VISIT (OUTPATIENT)
Dept: FAMILY MEDICINE CLINIC | Facility: CLINIC | Age: 59
End: 2024-12-06
Payer: COMMERCIAL

## 2024-12-06 VITALS
SYSTOLIC BLOOD PRESSURE: 157 MMHG | HEART RATE: 78 BPM | BODY MASS INDEX: 23.17 KG/M2 | HEIGHT: 73 IN | OXYGEN SATURATION: 98 % | DIASTOLIC BLOOD PRESSURE: 82 MMHG | WEIGHT: 174.8 LBS

## 2024-12-06 DIAGNOSIS — I10 ESSENTIAL HYPERTENSION: Primary | ICD-10-CM

## 2024-12-06 DIAGNOSIS — M25.50 ARTHRALGIA, UNSPECIFIED JOINT: ICD-10-CM

## 2024-12-06 DIAGNOSIS — R97.20 ELEVATED PSA: ICD-10-CM

## 2024-12-06 PROCEDURE — 3079F DIAST BP 80-89 MM HG: CPT | Performed by: NURSE PRACTITIONER

## 2024-12-06 PROCEDURE — 99214 OFFICE O/P EST MOD 30 MIN: CPT | Performed by: NURSE PRACTITIONER

## 2024-12-06 PROCEDURE — 3077F SYST BP >= 140 MM HG: CPT | Performed by: NURSE PRACTITIONER

## 2024-12-06 RX ORDER — CELECOXIB 200 MG/1
200 CAPSULE ORAL DAILY
Qty: 30 CAPSULE | Refills: 3 | Status: SHIPPED | OUTPATIENT
Start: 2024-12-06

## 2024-12-06 SDOH — ECONOMIC STABILITY: FOOD INSECURITY: WITHIN THE PAST 12 MONTHS, THE FOOD YOU BOUGHT JUST DIDN'T LAST AND YOU DIDN'T HAVE MONEY TO GET MORE.: NEVER TRUE

## 2024-12-06 SDOH — ECONOMIC STABILITY: FOOD INSECURITY: WITHIN THE PAST 12 MONTHS, YOU WORRIED THAT YOUR FOOD WOULD RUN OUT BEFORE YOU GOT MONEY TO BUY MORE.: NEVER TRUE

## 2024-12-06 SDOH — ECONOMIC STABILITY: INCOME INSECURITY: HOW HARD IS IT FOR YOU TO PAY FOR THE VERY BASICS LIKE FOOD, HOUSING, MEDICAL CARE, AND HEATING?: NOT HARD AT ALL

## 2024-12-06 ASSESSMENT — ENCOUNTER SYMPTOMS
ABDOMINAL DISTENTION: 0
RECTAL PAIN: 0
CONSTIPATION: 0
EYE DISCHARGE: 0
WHEEZING: 0
APNEA: 0
VOICE CHANGE: 0
EYES NEGATIVE: 1
CHEST TIGHTNESS: 0
SINUS PRESSURE: 0
SHORTNESS OF BREATH: 0
EYE ITCHING: 0
COUGH: 0
ABDOMINAL PAIN: 0
VOMITING: 0
EYE REDNESS: 0
CHOKING: 0
PHOTOPHOBIA: 0
EYE PAIN: 0
SORE THROAT: 0
SINUS PAIN: 0
DIARRHEA: 0
BACK PAIN: 0
NAUSEA: 0
STRIDOR: 0
ANAL BLEEDING: 0
TROUBLE SWALLOWING: 0
RHINORRHEA: 0
FACIAL SWELLING: 0
COLOR CHANGE: 0
BLOOD IN STOOL: 0
ALLERGIC/IMMUNOLOGIC NEGATIVE: 1
RESPIRATORY NEGATIVE: 1
GASTROINTESTINAL NEGATIVE: 1

## 2024-12-06 ASSESSMENT — PATIENT HEALTH QUESTIONNAIRE - PHQ9
2. FEELING DOWN, DEPRESSED OR HOPELESS: NOT AT ALL
SUM OF ALL RESPONSES TO PHQ QUESTIONS 1-9: 0
SUM OF ALL RESPONSES TO PHQ9 QUESTIONS 1 & 2: 0
1. LITTLE INTEREST OR PLEASURE IN DOING THINGS: NOT AT ALL

## 2024-12-06 NOTE — PROGRESS NOTES
\"Have you been to the ER, urgent care clinic since your last visit?  Hospitalized since your last visit?\"    NO    “Have you seen or consulted any other health care providers outside our system since your last visit?”    NO          
as prescribed however.  He reports being asymptomatic and has no headache or chest pain or palpitation.  Reiterated importance of low-sodium diet.  Respectfully declined any medication changes at this time and wishes to do better diet.  We will plan to reevaluate in a few weeks.  If persistently elevated, we will need to adjust medication to reduce BP.  Also advised patient on importance of monitoring home blood pressure reading.    2. Arthralgia, unspecified joint  -     celecoxib (CELEBREX) 200 MG capsule; Take 1 capsule by mouth daily As needed for pain and inflammation, Disp-30 capsule, R-3Normal    Has had more joint stiffness to bilateral hands and discomfort.  Meloxicam does provide some relief but not long-lasting.  We will trial Celebrex in place.Reviewed risks and side effects of medication including increased GI upset, increased risks for cardiac disease/MI, and increased risk of nephrotoxicity.  Patient was advised to take medication with food, stay hydrated, and to avoid oral OTC NSAID aside for Tylenol/acetaminophen such as ibuprofen, Motrin, naproxen, Aleve, Excedrin, BC or Goody powder.    3. Elevated PSA   Now established with urology oncology.  Will defer to specialist for monitoring and recommendation.  Appreciate their assistance.            Elements of this note have been dictated using speech recognition software. As a result, errors of speech recognition may have occurred.      On this date 12/6/2024 I have spent 30 minutes reviewing previous notes, test results and face to face with the patient discussing the diagnosis and importance of compliance with the treatment plan as well as documenting on the day of the visit. Greater than 50% of this visit was spent counseling the patient about test results, prognosis, importance of compliance, education about disease process, benefits of medications, instructions for management of acute symptoms, and follow up plans.    Return in about 6 weeks

## 2024-12-16 ENCOUNTER — HOSPITAL ENCOUNTER (OUTPATIENT)
Dept: MRI IMAGING | Age: 59
Discharge: HOME | End: 2024-12-19
Payer: COMMERCIAL

## 2024-12-16 DIAGNOSIS — R97.20 ELEVATED PSA: ICD-10-CM

## 2024-12-16 PROCEDURE — A9579 GAD-BASE MR CONTRAST NOS,1ML: HCPCS | Performed by: PHYSICIAN ASSISTANT

## 2024-12-16 PROCEDURE — 6360000004 HC RX CONTRAST MEDICATION: Performed by: PHYSICIAN ASSISTANT

## 2024-12-16 PROCEDURE — 72197 MRI PELVIS W/O & W/DYE: CPT

## 2024-12-16 RX ADMIN — GADOTERIDOL 16 ML: 279.3 INJECTION, SOLUTION INTRAVENOUS at 20:51

## 2024-12-17 NOTE — PROGRESS NOTES
administration of contrast material, given the patient's  indications for the examination. If IV contrast material had not been  administered, the likely of detecting abnormalities relevant to the patient's  condition would have been substantially decreased.  All lesions assessed are  based on PI-RAD compliance.     FINDINGS:  Prostate Size: 3.7 x 3.4 x 2.7 cm with a calculated volume of 17 mL.     Peripheral Zone:No suspicious peripheral zone lesion. Scattered linear and  wedge-shaped T2 hypointensities suggesting sequelae of prior prostatitis and/or  fibrosis.     Transition/Central Zone: Focal lesion(s) described below.     BPH: No significant BPH.     Lesion #1  -Size and location: 1.0 cm indistinct lesion right mid gland anterior transition  zone  -T2 Axial image: 22  -DWI/ADC: Diffusion restriction.  -DCE: The lesion demonstrates enhancement.  -Prostate Margin: No evidence of extracapsular extension.  -PI-RADS Category: 4     Neurovascular Bundles:  Intact.      Seminal Vesicles: Seminal vesicles are unremarkable.     Lymph Nodes:  No appreciable pelvic lymphadenopathy.     Additional Information: None        IMPRESSION:  PI-RADS 4 lesion right mid gland transition zone, 1.0 cm.     Chronic findings.                         ASSESSMENT:    Mr. Chung Goldsmith is a 59 y.o. male with a diagnosis of elevated PSA.  We discussed options moving forward.  We talked about the potential for transperineal prostate biopsy versus continued PSA surveillance.  We discussed the potential risks and side effects of biopsy.  After discussion he would like to move forward with prostate biopsy.  We will get that scheduled for the next 1 to 2 months.          We reviewed with the patient the significance of an elevated PSA.  We discussed benign etiologies such as; benign enlargement of the prostate, inflammation of the prostate, infections of the prostate, and prostatic manipulation as a possible cause. We also discussed the

## 2024-12-23 ENCOUNTER — OFFICE VISIT (OUTPATIENT)
Dept: ONCOLOGY | Age: 59
End: 2024-12-23
Payer: COMMERCIAL

## 2024-12-23 ENCOUNTER — PREP FOR PROCEDURE (OUTPATIENT)
Dept: ONCOLOGY | Age: 59
End: 2024-12-23

## 2024-12-23 VITALS
HEART RATE: 69 BPM | BODY MASS INDEX: 24.52 KG/M2 | DIASTOLIC BLOOD PRESSURE: 84 MMHG | WEIGHT: 181 LBS | TEMPERATURE: 98 F | HEIGHT: 72 IN | SYSTOLIC BLOOD PRESSURE: 140 MMHG | RESPIRATION RATE: 16 BRPM | OXYGEN SATURATION: 100 %

## 2024-12-23 DIAGNOSIS — R97.20 ELEVATED PSA: Primary | ICD-10-CM

## 2024-12-23 DIAGNOSIS — R97.20 ELEVATED PSA: ICD-10-CM

## 2024-12-23 PROCEDURE — 99214 OFFICE O/P EST MOD 30 MIN: CPT | Performed by: UROLOGY

## 2024-12-23 ASSESSMENT — ENCOUNTER SYMPTOMS
RESPIRATORY NEGATIVE: 1
GASTROINTESTINAL NEGATIVE: 1

## 2024-12-23 ASSESSMENT — PATIENT HEALTH QUESTIONNAIRE - PHQ9
1. LITTLE INTEREST OR PLEASURE IN DOING THINGS: NOT AT ALL
SUM OF ALL RESPONSES TO PHQ9 QUESTIONS 1 & 2: 0
2. FEELING DOWN, DEPRESSED OR HOPELESS: NOT AT ALL
SUM OF ALL RESPONSES TO PHQ QUESTIONS 1-9: 0

## 2024-12-23 NOTE — PATIENT INSTRUCTIONS
Patient Information from Today's Visit    The members of your Oncology Medical Home are listed below:    Physician Provider: Jad Fuentes, Urologic Oncologist  Advanced Practice Clinician: ROXI Sherman  Nurse Navigator: N/A  Medical Assistant: Caron REIS CMA  :Nichelle SANDOVAL  Supportive Care Services: Michael RUIZ LMSW    Diagnosis: Elevated PSA    Follow Up Instructions:   MRI reviewed  We would recommend a transperineal prostate biopsy at this time with a plan to see you back once that's done.      We reviewed with the patient the significance of an elevated PSA.  We discussed benign etiologies such as; benign enlargement of the prostate, inflammation of the prostate, infections of the prostate, and prostatic manipulation as a possible cause. We also discussed the possibility of prostate cancer. The only way to really differentiate this would be a biopsy of the prostate. The most common way to diagnose prostate cancer is an ultrasound-guided biopsy of the prostate with our without MRI fusion.  Alternatively, we could repeat the PSA, do a free/total PSA, or just manage the patient with observation. We discussed the risks inherent in a biopsy including but not limited to; bleeding, infection, urinary retention, and pain associated with the procedure.  We recommend the patient stop any aspirin or nonsteroidal anti-inflammatories or other blood thinners approximately 5 days prior to the procedure. If the patient is taking any of these medications for heart issues or other specific reasons, he needs to consult with the prescribing doctor before stopping treatment.  We explained that we give him prophylactic antibiotic(s) around the time of the procedure to help prevent serious infectious complications.  We also discussed concerns with over-treatment of prostate cancer and that the main purpose of the biopsy is to hopefully rule out high-grade prostate cancer. After a full discussion regarding the

## 2025-01-21 ENCOUNTER — OFFICE VISIT (OUTPATIENT)
Dept: FAMILY MEDICINE CLINIC | Facility: CLINIC | Age: 60
End: 2025-01-21
Payer: COMMERCIAL

## 2025-01-21 VITALS
OXYGEN SATURATION: 97 % | HEIGHT: 72 IN | HEART RATE: 64 BPM | SYSTOLIC BLOOD PRESSURE: 154 MMHG | DIASTOLIC BLOOD PRESSURE: 64 MMHG | BODY MASS INDEX: 24.41 KG/M2 | WEIGHT: 180.2 LBS

## 2025-01-21 DIAGNOSIS — R53.83 OTHER FATIGUE: ICD-10-CM

## 2025-01-21 DIAGNOSIS — E55.9 VITAMIN D DEFICIENCY: ICD-10-CM

## 2025-01-21 DIAGNOSIS — E29.1 HYPOGONADISM IN MALE: ICD-10-CM

## 2025-01-21 DIAGNOSIS — I10 ESSENTIAL HYPERTENSION: Primary | ICD-10-CM

## 2025-01-21 DIAGNOSIS — E78.00 PURE HYPERCHOLESTEROLEMIA: ICD-10-CM

## 2025-01-21 PROCEDURE — 3077F SYST BP >= 140 MM HG: CPT | Performed by: NURSE PRACTITIONER

## 2025-01-21 PROCEDURE — 99214 OFFICE O/P EST MOD 30 MIN: CPT | Performed by: NURSE PRACTITIONER

## 2025-01-21 PROCEDURE — 3078F DIAST BP <80 MM HG: CPT | Performed by: NURSE PRACTITIONER

## 2025-01-21 RX ORDER — LOSARTAN POTASSIUM 100 MG/1
100 TABLET ORAL DAILY
Qty: 90 TABLET | Refills: 3 | Status: SHIPPED | OUTPATIENT
Start: 2025-01-21

## 2025-01-21 RX ORDER — ERGOCALCIFEROL 1.25 MG/1
CAPSULE, LIQUID FILLED ORAL
Qty: 8 CAPSULE | Refills: 3 | Status: SHIPPED | OUTPATIENT
Start: 2025-01-21

## 2025-01-21 SDOH — ECONOMIC STABILITY: FOOD INSECURITY: WITHIN THE PAST 12 MONTHS, YOU WORRIED THAT YOUR FOOD WOULD RUN OUT BEFORE YOU GOT MONEY TO BUY MORE.: NEVER TRUE

## 2025-01-21 SDOH — ECONOMIC STABILITY: FOOD INSECURITY: WITHIN THE PAST 12 MONTHS, THE FOOD YOU BOUGHT JUST DIDN'T LAST AND YOU DIDN'T HAVE MONEY TO GET MORE.: NEVER TRUE

## 2025-01-21 ASSESSMENT — ENCOUNTER SYMPTOMS
CHOKING: 0
TROUBLE SWALLOWING: 0
RECTAL PAIN: 0
SORE THROAT: 0
COLOR CHANGE: 0
EYE ITCHING: 0
WHEEZING: 0
BACK PAIN: 0
RESPIRATORY NEGATIVE: 1
ABDOMINAL PAIN: 0
NAUSEA: 0
SHORTNESS OF BREATH: 0
GASTROINTESTINAL NEGATIVE: 1
VOMITING: 0
SINUS PRESSURE: 0
RHINORRHEA: 0
EYE PAIN: 0
BLOOD IN STOOL: 0
COUGH: 0
STRIDOR: 0
APNEA: 0
ALLERGIC/IMMUNOLOGIC NEGATIVE: 1
CHEST TIGHTNESS: 0
VOICE CHANGE: 0
EYES NEGATIVE: 1
ANAL BLEEDING: 0
FACIAL SWELLING: 0
EYE DISCHARGE: 0
DIARRHEA: 0
ABDOMINAL DISTENTION: 0
SINUS PAIN: 0
EYE REDNESS: 0
PHOTOPHOBIA: 0
CONSTIPATION: 0

## 2025-01-21 ASSESSMENT — PATIENT HEALTH QUESTIONNAIRE - PHQ9
1. LITTLE INTEREST OR PLEASURE IN DOING THINGS: NOT AT ALL
2. FEELING DOWN, DEPRESSED OR HOPELESS: NOT AT ALL
SUM OF ALL RESPONSES TO PHQ QUESTIONS 1-9: 0
SUM OF ALL RESPONSES TO PHQ QUESTIONS 1-9: 0
SUM OF ALL RESPONSES TO PHQ9 QUESTIONS 1 & 2: 0
SUM OF ALL RESPONSES TO PHQ QUESTIONS 1-9: 0
SUM OF ALL RESPONSES TO PHQ QUESTIONS 1-9: 0

## 2025-01-21 NOTE — PROGRESS NOTES
PROGRESS NOTE    Chief Complaint   Patient presents with    Hypertension       SUBJECTIVE:     Chung Goldsmith is a 59 y.o. male with hx of  hypertension, hyperlipidemia, anxiety, hypogonadism, seen today in office for follow-up for his blood pressure management.  He reports being under a significant amount of stress as he recently switched jobs and recently completed his orientation and now having to learn new techniques with his job.  He feels more stressed and under pressure.  He is currently exercising and doing push-up as well as weight lifting to help with some stress relief and also for health.  He has been monitoring his salt intake.  He reports no chest pain, no headaches no shortness of breath, no palpitation, no unilateral or focal weakness, no changes in or incontinence of bladder or bowel function.      Past Medical History, Past Surgical History, Family history, Social History, and Medications were all reviewed with the patient today and updated as necessary.       Current Outpatient Medications   Medication Sig Dispense Refill    losartan (COZAAR) 100 MG tablet Take 1 tablet by mouth daily For blood pressure 90 tablet 3    vitamin D (ERGOCALCIFEROL) 1.25 MG (76850 UT) CAPS capsule TAKE 1 CAPSULE BY MOUTH EVERY 14 DAYS WITH FOOD 8 capsule 3    sildenafil (VIAGRA) 100 MG tablet TAKE ONE TABLET BY MOUTH ONE TIME DAILY AS NEEDED 30 tablet 11    atenolol (TENORMIN) 50 MG tablet Take 1 tablet by mouth daily 90 tablet 3    testosterone cypionate (DEPOTESTOTERONE CYPIONATE) 200 MG/ML injection INJECT 1ML INTRAMUSCULARLY EVERY 21 DAYS 10 mL 1    celecoxib (CELEBREX) 200 MG capsule Take 1 capsule by mouth daily As needed for pain and inflammation (Patient not taking: Reported on 1/21/2025) 30 capsule 3    hydrOXYzine HCl (ATARAX) 25 MG tablet Take 1/2 to 1 tablet orally up to twice daily as needed for anxiety (Patient not taking: Reported on 1/21/2025) 60 tablet 5    cyclobenzaprine (FLEXERIL) 10 MG

## 2025-02-05 ENCOUNTER — TELEPHONE (OUTPATIENT)
Dept: ONCOLOGY | Age: 60
End: 2025-02-05

## 2025-02-05 NOTE — TELEPHONE ENCOUNTER
Physician provider: Dr. Fuentes  Reason for today's call (Please detail here patients chief complaint):   Pt returning call, but doesn't know who called him. It is possible it was preassessment.  Last office visit:n/a  Patient Callback Number: [phone]  Was callback number verified?: Yes  Preferred pharmacy (If refill request): n/a  Calls to office within the last 48 hours?:No    Patient notified that their information will be routed to the [facility] clinical triage team for review. Patient is advised that they will receive a phone call from the triage department. If symptom related and symptoms worsen before receiving a call back, the patient has been advised to proceed to the nearest ED.

## 2025-02-05 NOTE — PROGRESS NOTES
Patient verified name and .  Order for consent  was found in EHR and does match case posting; patient verifies procedure.   Type 1B surgery, phone assessment complete.  Orders  received.  Labs per surgeon: none  Labs per anesthesia protocol: none    Patient answered medical/surgical history questions at their best of ability. All prior to admission medications documented in EPIC.    Patient instructed to continue taking all prescription medications up to the day of surgery but to take only the following medications the day of surgery according to anesthesia guidelines with a small sip of water: Atenolol Also, patient is requested to take 2 Tylenol in the morning and then again before bed on the day before surgery. Regular or extra strength may be used.       Patient informed that all vitamins and supplements should be held 7 days prior to surgery and NSAIDS 5 days prior to surgery. Prescription meds to hold 24 hours prior to surgery :Viagra     Patient instructed on the following:    > Arrive at Main Entrance, time of arrival to be called the day before by 1700  > NPO after midnight, unless otherwise indicated, including gum, mints, and ice chips  > Responsible adult must drive patient to the hospital, stay during surgery, and patient will need supervision 24 hours after anesthesia  > Use non moisturizing soap in shower the night before surgery and on the morning of surgery  > All piercings must be removed prior to arrival.    > Leave all valuables (money and jewelry) at home but bring insurance card and ID on DOS.   > You may be required to pay a deductible or co-pay on the day of your procedure. You can pre-pay by calling [phone] if your surgery is at the [facility] or [phone] if your surgery is at the [facility].  > Do not wear make-up, nail polish, lotions, cologne, perfumes, powders, or oil on skin. Artificial nails are not permitted.

## 2025-02-10 ENCOUNTER — ANESTHESIA EVENT (OUTPATIENT)
Dept: SURGERY | Age: 60
End: 2025-02-10
Payer: COMMERCIAL

## 2025-02-11 ENCOUNTER — ANESTHESIA (OUTPATIENT)
Dept: SURGERY | Age: 60
End: 2025-02-11
Payer: COMMERCIAL

## 2025-02-11 ENCOUNTER — HOSPITAL ENCOUNTER (OUTPATIENT)
Age: 60
Setting detail: OUTPATIENT SURGERY
Discharge: HOME | End: 2025-02-11
Attending: UROLOGY | Admitting: UROLOGY
Payer: COMMERCIAL

## 2025-02-11 VITALS
TEMPERATURE: 97.9 F | HEART RATE: 58 BPM | RESPIRATION RATE: 14 BRPM | WEIGHT: 178 LBS | HEIGHT: 78 IN | DIASTOLIC BLOOD PRESSURE: 88 MMHG | SYSTOLIC BLOOD PRESSURE: 161 MMHG | OXYGEN SATURATION: 98 % | BODY MASS INDEX: 20.59 KG/M2

## 2025-02-11 DIAGNOSIS — R97.20 ELEVATED PSA: ICD-10-CM

## 2025-02-11 LAB
APPEARANCE UR: CLEAR
BILIRUB UR QL: NEGATIVE
COLOR UR: NORMAL
GLUCOSE UR STRIP.AUTO-MCNC: NEGATIVE MG/DL
HGB UR QL STRIP: NEGATIVE
KETONES UR QL STRIP.AUTO: NEGATIVE MG/DL
LEUKOCYTE ESTERASE UR QL STRIP.AUTO: NEGATIVE
NITRITE UR QL STRIP.AUTO: NEGATIVE
PH UR STRIP: 5.5 (ref 5–9)
PROT UR STRIP-MCNC: NEGATIVE MG/DL
SP GR UR REFRACTOMETRY: 1.02 (ref 1–1.02)
UROBILINOGEN UR QL STRIP.AUTO: 0.2 EU/DL (ref 0.2–1)

## 2025-02-11 PROCEDURE — 88305 TISSUE EXAM BY PATHOLOGIST: CPT

## 2025-02-11 PROCEDURE — 2709999900 HC NON-CHARGEABLE SUPPLY: Performed by: UROLOGY

## 2025-02-11 PROCEDURE — 2500000003 HC RX 250 WO HCPCS: Performed by: PHYSICIAN ASSISTANT

## 2025-02-11 PROCEDURE — 3600000013 HC SURGERY LEVEL 3 ADDTL 15MIN: Performed by: UROLOGY

## 2025-02-11 PROCEDURE — 3600000003 HC SURGERY LEVEL 3 BASE: Performed by: UROLOGY

## 2025-02-11 PROCEDURE — 6360000002 HC RX W HCPCS: Performed by: UROLOGY

## 2025-02-11 PROCEDURE — 3700000001 HC ADD 15 MINUTES (ANESTHESIA): Performed by: UROLOGY

## 2025-02-11 PROCEDURE — 81003 URINALYSIS AUTO W/O SCOPE: CPT

## 2025-02-11 PROCEDURE — 6360000002 HC RX W HCPCS: Performed by: STUDENT IN AN ORGANIZED HEALTH CARE EDUCATION/TRAINING PROGRAM

## 2025-02-11 PROCEDURE — 7100000011 HC PHASE II RECOVERY - ADDTL 15 MIN: Performed by: UROLOGY

## 2025-02-11 PROCEDURE — 2500000003 HC RX 250 WO HCPCS: Performed by: STUDENT IN AN ORGANIZED HEALTH CARE EDUCATION/TRAINING PROGRAM

## 2025-02-11 PROCEDURE — 6360000002 HC RX W HCPCS: Performed by: PHYSICIAN ASSISTANT

## 2025-02-11 PROCEDURE — 3700000000 HC ANESTHESIA ATTENDED CARE: Performed by: UROLOGY

## 2025-02-11 PROCEDURE — 7100000010 HC PHASE II RECOVERY - FIRST 15 MIN: Performed by: UROLOGY

## 2025-02-11 RX ORDER — SODIUM CHLORIDE, SODIUM LACTATE, POTASSIUM CHLORIDE, CALCIUM CHLORIDE 600; 310; 30; 20 MG/100ML; MG/100ML; MG/100ML; MG/100ML
INJECTION, SOLUTION INTRAVENOUS CONTINUOUS
Status: DISCONTINUED | OUTPATIENT
Start: 2025-02-11 | End: 2025-02-11 | Stop reason: HOSPADM

## 2025-02-11 RX ORDER — LIDOCAINE HYDROCHLORIDE 20 MG/ML
INJECTION, SOLUTION EPIDURAL; INFILTRATION; INTRACAUDAL; PERINEURAL
Status: DISCONTINUED | OUTPATIENT
Start: 2025-02-11 | End: 2025-02-11 | Stop reason: SDUPTHER

## 2025-02-11 RX ORDER — BUPIVACAINE HYDROCHLORIDE 2.5 MG/ML
INJECTION, SOLUTION EPIDURAL; INFILTRATION; INTRACAUDAL PRN
Status: DISCONTINUED | OUTPATIENT
Start: 2025-02-11 | End: 2025-02-11 | Stop reason: ALTCHOICE

## 2025-02-11 RX ORDER — LIDOCAINE HYDROCHLORIDE 10 MG/ML
1 INJECTION, SOLUTION INFILTRATION; PERINEURAL
Status: DISCONTINUED | OUTPATIENT
Start: 2025-02-11 | End: 2025-02-11 | Stop reason: HOSPADM

## 2025-02-11 RX ORDER — SODIUM CHLORIDE 9 MG/ML
INJECTION, SOLUTION INTRAVENOUS PRN
Status: DISCONTINUED | OUTPATIENT
Start: 2025-02-11 | End: 2025-02-11 | Stop reason: HOSPADM

## 2025-02-11 RX ORDER — OXYCODONE HYDROCHLORIDE 5 MG/1
10 TABLET ORAL PRN
Status: DISCONTINUED | OUTPATIENT
Start: 2025-02-11 | End: 2025-02-11 | Stop reason: HOSPADM

## 2025-02-11 RX ORDER — DIPHENHYDRAMINE HYDROCHLORIDE 50 MG/ML
12.5 INJECTION INTRAMUSCULAR; INTRAVENOUS
Status: DISCONTINUED | OUTPATIENT
Start: 2025-02-11 | End: 2025-02-11 | Stop reason: HOSPADM

## 2025-02-11 RX ORDER — DEXMEDETOMIDINE HYDROCHLORIDE 100 UG/ML
INJECTION, SOLUTION INTRAVENOUS
Status: DISCONTINUED | OUTPATIENT
Start: 2025-02-11 | End: 2025-02-11 | Stop reason: SDUPTHER

## 2025-02-11 RX ORDER — MIDAZOLAM HYDROCHLORIDE 2 MG/2ML
2 INJECTION, SOLUTION INTRAMUSCULAR; INTRAVENOUS
Status: DISCONTINUED | OUTPATIENT
Start: 2025-02-11 | End: 2025-02-11 | Stop reason: HOSPADM

## 2025-02-11 RX ORDER — OXYCODONE HYDROCHLORIDE 5 MG/1
5 TABLET ORAL PRN
Status: DISCONTINUED | OUTPATIENT
Start: 2025-02-11 | End: 2025-02-11 | Stop reason: HOSPADM

## 2025-02-11 RX ORDER — SODIUM CHLORIDE 0.9 % (FLUSH) 0.9 %
SYRINGE (ML) INJECTION
Status: DISCONTINUED | OUTPATIENT
Start: 2025-02-11 | End: 2025-02-11 | Stop reason: SDUPTHER

## 2025-02-11 RX ORDER — PROPOFOL 10 MG/ML
INJECTION, EMULSION INTRAVENOUS
Status: DISCONTINUED | OUTPATIENT
Start: 2025-02-11 | End: 2025-02-11 | Stop reason: SDUPTHER

## 2025-02-11 RX ORDER — NALOXONE HYDROCHLORIDE 0.4 MG/ML
INJECTION, SOLUTION INTRAMUSCULAR; INTRAVENOUS; SUBCUTANEOUS PRN
Status: DISCONTINUED | OUTPATIENT
Start: 2025-02-11 | End: 2025-02-11 | Stop reason: HOSPADM

## 2025-02-11 RX ORDER — LIDOCAINE HYDROCHLORIDE 10 MG/ML
INJECTION, SOLUTION INFILTRATION; PERINEURAL PRN
Status: DISCONTINUED | OUTPATIENT
Start: 2025-02-11 | End: 2025-02-11 | Stop reason: ALTCHOICE

## 2025-02-11 RX ORDER — SODIUM CHLORIDE 0.9 % (FLUSH) 0.9 %
5-40 SYRINGE (ML) INJECTION EVERY 12 HOURS SCHEDULED
Status: DISCONTINUED | OUTPATIENT
Start: 2025-02-11 | End: 2025-02-11 | Stop reason: HOSPADM

## 2025-02-11 RX ORDER — PROCHLORPERAZINE EDISYLATE 5 MG/ML
5 INJECTION INTRAMUSCULAR; INTRAVENOUS
Status: DISCONTINUED | OUTPATIENT
Start: 2025-02-11 | End: 2025-02-11 | Stop reason: HOSPADM

## 2025-02-11 RX ORDER — SODIUM CHLORIDE 0.9 % (FLUSH) 0.9 %
5-40 SYRINGE (ML) INJECTION PRN
Status: DISCONTINUED | OUTPATIENT
Start: 2025-02-11 | End: 2025-02-11 | Stop reason: HOSPADM

## 2025-02-11 RX ORDER — ONDANSETRON 2 MG/ML
4 INJECTION INTRAMUSCULAR; INTRAVENOUS
Status: DISCONTINUED | OUTPATIENT
Start: 2025-02-11 | End: 2025-02-11 | Stop reason: HOSPADM

## 2025-02-11 RX ORDER — GLYCOPYRROLATE 0.2 MG/ML
INJECTION INTRAMUSCULAR; INTRAVENOUS
Status: DISCONTINUED | OUTPATIENT
Start: 2025-02-11 | End: 2025-02-11 | Stop reason: SDUPTHER

## 2025-02-11 RX ADMIN — DEXMEDETOMIDINE 4 MCG: 100 INJECTION, SOLUTION, CONCENTRATE INTRAVENOUS at 11:37

## 2025-02-11 RX ADMIN — PROPOFOL 50 MG: 10 INJECTION, EMULSION INTRAVENOUS at 11:36

## 2025-02-11 RX ADMIN — PROPOFOL 70 MG: 10 INJECTION, EMULSION INTRAVENOUS at 11:29

## 2025-02-11 RX ADMIN — PROPOFOL 50 MG: 10 INJECTION, EMULSION INTRAVENOUS at 11:34

## 2025-02-11 RX ADMIN — SODIUM CHLORIDE, PRESERVATIVE FREE 10 ML: 5 INJECTION INTRAVENOUS at 11:34

## 2025-02-11 RX ADMIN — PROPOFOL 20 MG: 10 INJECTION, EMULSION INTRAVENOUS at 11:33

## 2025-02-11 RX ADMIN — PROPOFOL 20 MG: 10 INJECTION, EMULSION INTRAVENOUS at 11:39

## 2025-02-11 RX ADMIN — GLYCOPYRROLATE 0.2 MG: 0.2 INJECTION INTRAMUSCULAR; INTRAVENOUS at 11:27

## 2025-02-11 RX ADMIN — SODIUM CHLORIDE, PRESERVATIVE FREE 10 ML: 5 INJECTION INTRAVENOUS at 11:29

## 2025-02-11 RX ADMIN — SODIUM CHLORIDE, PRESERVATIVE FREE 10 ML: 5 INJECTION INTRAVENOUS at 11:37

## 2025-02-11 RX ADMIN — PROPOFOL 50 MG: 10 INJECTION, EMULSION INTRAVENOUS at 11:31

## 2025-02-11 RX ADMIN — SODIUM CHLORIDE, PRESERVATIVE FREE 10 ML: 5 INJECTION INTRAVENOUS at 11:44

## 2025-02-11 RX ADMIN — LIDOCAINE HYDROCHLORIDE 80 MG: 20 INJECTION, SOLUTION EPIDURAL; INFILTRATION; INTRACAUDAL; PERINEURAL at 11:29

## 2025-02-11 RX ADMIN — DEXMEDETOMIDINE 4 MCG: 100 INJECTION, SOLUTION, CONCENTRATE INTRAVENOUS at 11:39

## 2025-02-11 RX ADMIN — CEFAZOLIN 2000 MG: 2 INJECTION, POWDER, FOR SOLUTION INTRAMUSCULAR; INTRAVENOUS at 11:29

## 2025-02-11 RX ADMIN — SODIUM CHLORIDE, PRESERVATIVE FREE 10 ML: 5 INJECTION INTRAVENOUS at 11:39

## 2025-02-11 RX ADMIN — SODIUM CHLORIDE, PRESERVATIVE FREE 10 ML: 5 INJECTION INTRAVENOUS at 11:32

## 2025-02-11 RX ADMIN — PROPOFOL 20 MG: 10 INJECTION, EMULSION INTRAVENOUS at 11:41

## 2025-02-11 RX ADMIN — PROPOFOL 20 MG: 10 INJECTION, EMULSION INTRAVENOUS at 11:37

## 2025-02-11 ASSESSMENT — ENCOUNTER SYMPTOMS
GASTROINTESTINAL NEGATIVE: 1
RESPIRATORY NEGATIVE: 1

## 2025-02-11 ASSESSMENT — PAIN - FUNCTIONAL ASSESSMENT: PAIN_FUNCTIONAL_ASSESSMENT: 0-10

## 2025-02-11 NOTE — OP NOTE
Patient: Chung Goldsmith, [id number]    Date of Surgery: 02/11/25    Preoperative Diagnosis: Elevated PSA    Postoperative Diagnosis:  same    Surgeon(s) and Role:     * Chin Fuentes MD - Primary     Anesthesia:  MAC     Procedure: Procedure(s):  URONAV MRI Fusion Transperineal Ultrasound-Guided Prostate Biopsy     Indications:     Discussed the risk of surgery including infection, hematoma, bleeding, urinary retention, pain, and the risks of anesthethesia.  The patient understands the risks, any and all questions were answered to the patient's satisfaction and signed the consent for operation.     URONAV MRI FUSED TRANSPERINEAL ULTRASOUND GUIDED BIOPSY OF THE PROSTATE    All risks, benefits and alternatives were again reviewed and he is willing to proceed at this time.  The patient was placed in low lithotomy.   Ancef was given as a prophylactic antibiotic.  Chloroprep was used to prep the perineal area.  Approximately 4-5 cc of 0.25% marcaine mixed equally with 1% lidocaine plain was injected under the perineal skin at the needle insertion sites. I then inserted the transrectal ultrasound probe into the rectum.  The prostate was visualized.  The prostate appeared homogenous in appearance.   Ultrasonographic sweep of the prostate was performed to obtain images to link to MRI via URONAV.  There was one region of interest (right TZ) based upon MRI imaging.  3 biopsies of regions of interest were then obtained using the ultrasound images for guidance that had been linked to the previous MRI using Uronav.  I then performed 10 needle core biopsies using a standard transperineal biopsy format with traditional ultrasound images for guidance.  The ultrasound probe was removed.  The patient tolerated the procedure well.      PROSTATE VOLUME:  22 cc    JORDAN: No induration or nodule     Estimated Blood Loss:  minimal    Specimens: prostate biopsies    ID Type Source Tests Collected by Time Destination   A : ALVIN #1 ALLYSSA

## 2025-02-11 NOTE — ANESTHESIA POSTPROCEDURE EVALUATION
Department of Anesthesiology  Postprocedure Note    Patient: Chung Goldsmith  MRN: [id number]  YOB: 1965  Date of evaluation: 2/11/2025    Procedure Summary       Date: 02/11/25 Room / Location: [facility] MAIN OR 02 / [facility] MAIN OR    Anesthesia Start: 1115 Anesthesia Stop: 1156    Procedure: PROSTATE BIOPSY TRANSPERINEAL (Perineum) Diagnosis:       Elevated PSA      (Elevated PSA [R97.20])    Providers: Jad Fuentes MD Responsible Provider: Dave Paniagua MD    Anesthesia Type: TIVA ASA Status: 2            Anesthesia Type: TIVA    Ana Phase I: Ana Score: 7    Ana Phase II:      Anesthesia Post Evaluation    Patient location during evaluation: PACU  Patient participation: complete - patient participated  Level of consciousness: sleepy but conscious  Airway patency: patent  Nausea & Vomiting: no nausea and no vomiting  Cardiovascular status: blood pressure returned to baseline and hemodynamically stable  Respiratory status: acceptable  Hydration status: euvolemic  There was medical reason for not using a multimodal analgesia pain management approach.Pain management: adequate    No notable events documented.

## 2025-02-11 NOTE — ANESTHESIA PRE PROCEDURE
Department of Anesthesiology  Preprocedure Note       Name:  Chung Goldsmith   Age:  59 y.o.  :  1965                                          MRN:  [id number]         Date:  2025      Surgeon: Surgeon(s):  Jad Fuentes MD    Procedure: Procedure(s):  PROSTATE BIOPSY TRANSPERINEAL  PROSTATE TRANSRECTAL ULTRASOUND BIOPSY  PROSTATE BIOPSY FUSION    Medications prior to admission:   Prior to Admission medications    Medication Sig Start Date End Date Taking? Authorizing Provider   losartan (COZAAR) 100 MG tablet Take 1 tablet by mouth daily For blood pressure 25  Yes Marie Tanner APRN - CNP   vitamin D (ERGOCALCIFEROL) 1.25 MG (95965 UT) CAPS capsule TAKE 1 CAPSULE BY MOUTH EVERY 14 DAYS WITH FOOD 25  Yes Marie Tanner APRN - CNP   sildenafil (VIAGRA) 100 MG tablet TAKE ONE TABLET BY MOUTH ONE TIME DAILY AS NEEDED 24  Yes Marie Tanner APRN - CNP   atenolol (TENORMIN) 50 MG tablet Take 1 tablet by mouth daily 24  Yes Marie Tanner APRN - CNP   hydrOXYzine HCl (ATARAX) 25 MG tablet Take 1/2 to 1 tablet orally up to twice daily as needed for anxiety 24  Yes Marie Tanner APRN - CNP   celecoxib (CELEBREX) 200 MG capsule Take 1 capsule by mouth daily As needed for pain and inflammation  Patient not taking: Reported on 2025   Marie Tanner APRN - CNP   testosterone cypionate (DEPOTESTOTERONE CYPIONATE) 200 MG/ML injection INJECT 1ML INTRAMUSCULARLY EVERY 21 DAYS 24  Marie Tanner APRN - CNP   cyclobenzaprine (FLEXERIL) 10 MG tablet TAKE ONE-HALF TO ONE TABLET BY MOUTH EVERY EVENING AS NEEDED FOR MUSCLE SPASM  Patient not taking: Reported on 2025 3/2/23   Marie Tanner APRN - CNP   Naproxen Sodium 220 MG CAPS Take by mouth  Patient not taking: Reported on 2025    Automatic Reconciliation, Ar       Current medications:    Current Facility-Administered Medications   Medication Dose Route Frequency Provider Last Rate

## 2025-02-11 NOTE — DISCHARGE INSTRUCTIONS
My office will schedule your follow-up appointment.    Please call our office ([phone]) or return to ED if you experience fever > 101.5, severe pain, persistent nausea/vomiting, excessive bleeding, inability to urinate, or other concerns.     If you have had surgery in the past 7-10 days by one of our providers and are having fever, bleeding, or drainage from an incision, have an opening in an incision, or having issues urinating properly, please call [phone] during normal office hours. Please call [phone] for any immediate needs AFTER HOURS.     Prostate Biopsy and Ultrasound:   A prostate biopsy is a type of test. Your doctor takes small tissue samples from your prostate gland. Then another doctor looks at the tissue under a microscope to see if there are cancer cells.  This test is done by a doctor who specializes in men's genital and urinary problems (urologist). It can be done in your doctor's office, a day surgery clinic, or a hospital operating room. To get the tissue samples from the prostate, the doctor inserts a thin needle through the rectum, the urethra, or the area between the anus and scrotum (perineum). The most common method is through the rectum. Your doctor may use ultrasound to help guide the needle.  What else should you know about this test?  A prostate biopsy has a slight risk of causing problems such as infection or bleeding.  If the biopsy went through your rectum, you may have a small amount of bleeding from your rectum for 2 to 3 days after the biopsy.  You may have a little pain in your pelvic area. You may also have a little blood in your urine for 1 to 5 days.  You may have some blood in your semen for a week or longer.  Do not do heavy work or exercise for 4 hours after the test.  Your doctor will tell you how long it may take to get your results back.    Follow-up care is a key part of your treatment and safety. Be sure to make and go to all appointments, and call your  next office visit.

## 2025-02-11 NOTE — H&P
H&P Update:    The patient's last History and Physical was reviewed, and I examined the patient today.  There are no changes.  The surgical procedure and site were confirmed by the patient and me.    PE:  NAD  Heart- Reg, normal perfusion  Pulmonary- clear, normal respiratory effort  Abdomen- Soft, ND     Plan: The risks, benefits, and alternative treatment options were again discussed with the patient.  The patient understands and wants to proceed with the procedure--MRI fusion transperineal prostate biopsy.       Urologic Oncology  [facility] Hematology & Oncology  [address] [place], [place] [id number] [phone]        Mr. Chung Goldsmith is a 59 y.o. male with a diagnosis of elevated PSA.    INTERVAL HISTORY: Patient is here today for follow-up of his elevated PSA.  His PSA was 5.9.  It was rechecked on 12/4/2024 and was down to 5.3.  His digital rectal exam of that appointment was unremarkable.  He thinks he has an uncle that has a history of prostate cancer.    He had an MRI of his prostate on 12/16/2024 that shows a 17 cc gland.  He has a PI-RADS 4 lesion measuring 1 cm in the right mid transition zone.    He is not on any anticoagulation.  He has no significant lower urinary tract symptoms.      From previous note on 12/4/24:Chung Goldsmith is a 59 y.o. male who is referred for evaluation of elevated PSA.  He had routine screening PSA on 11/11/2024 that was elevated at 5.9.  This had previously been 3.7 on 5/13/2024.  He has had prior elevation to 4.5 in September 2023.     Patient denies any significant lower urinary tract symptoms.  He feels that he empties his bladder without significant retention and voids with good stream.  No dysuria or hematuria.  Gets up approximately 2 times per night to void.     Patient believes he has a family history of prostate cancer in an uncle however no first-degree relatives with history of prostate cancer.     Patient's other medical comorbidities  biopsy versus continued PSA surveillance.  We discussed the potential risks and side effects of biopsy.  After discussion he would like to move forward with prostate biopsy.  We will get that scheduled for the next 1 to 2 months.          We reviewed with the patient the significance of an elevated PSA.  We discussed benign etiologies such as; benign enlargement of the prostate, inflammation of the prostate, infections of the prostate, and prostatic manipulation as a possible cause. We also discussed the possibility of prostate cancer. The only way to really differentiate this would be a biopsy of the prostate. The most common way to diagnose prostate cancer is an ultrasound-guided biopsy of the prostate with our without MRI fusion.  Alternatively, we could repeat the PSA, do a free/total PSA, or just manage the patient with observation. We discussed the risks inherent in a biopsy including but not limited to; bleeding, infection, urinary retention, and pain associated with the procedure.  We recommend the patient stop any aspirin or nonsteroidal anti-inflammatories or other blood thinners approximately 5 days prior to the procedure. If the patient is taking any of these medications for heart issues or other specific reasons, he needs to consult with the prescribing doctor before stopping treatment.  We explained that we give him prophylactic antibiotic(s) around the time of the procedure to help prevent serious infectious complications.  We also discussed concerns with over-treatment of prostate cancer and that the main purpose of the biopsy is to hopefully rule out high-grade prostate cancer. After a full discussion regarding the potential risks, benefits, and treatment alternatives, the patient has decided to proceed with an MRI guided prostate biopsy via a transperineal approach.       PLAN:   -MRI reviewed  -We would recommend a transperineal prostate bx(February)  -RTC after bx

## 2025-02-19 NOTE — PROGRESS NOTES
Urologic Oncology  [facility] Hematology & Oncology  [address] [place] [id number]  [phone]        Mr. Chung Goldsmith is a 59 y.o. male with a diagnosis of GS 3+3=6 prostate cancer diagnosed on transperineal prostate biopsy on 2/11/25.  cT1c. PSA 5.3 on 12/4/24. MRI with PI-RADS 4 right mid gland TZ.    INTERVAL HISTORY: Patient is here today in follow-up after his transperineal prostate biopsy on 2/11/2025.  He was found to have 8 out of 13 cores of Geoffrey 3+3 equal 6.  These ranged from 5% involvement up to 50% core involvement.    His PSA on 12/4/2024 was 5.3.  His gland measured 22 cc.  JORDAN was negative, clinical stage T1c.    IPSS is 11 with a quality-of-life score of 2.  His kain today is 20.    He works at [employer] and is a relatively new employee.    He recovered from the biopsy well and had no known complications.      From previous note on 12/23/24:Patient is here today for follow-up of his elevated PSA.  His PSA was 5.9.  It was rechecked on 12/4/2024 and was down to 5.3.  His digital rectal exam of that appointment was unremarkable.  He thinks he has an uncle that has a history of prostate cancer.     He had an MRI of his prostate on 12/16/2024 that shows a 17 cc gland.  He has a PI-RADS 4 lesion measuring 1 cm in the right mid transition zone.     He is not on any anticoagulation.  He has no significant lower urinary tract symptoms.       Past medical, family and social histories, as well as medications and allergies, were reviewed and updated in the medical record as appropriate.    PMH:     Past Medical History:   Diagnosis Date    Chronic pain 11/2014    back pain and right buttocks, leg , foot    HNP (herniated nucleus pulposus), lumbar     Hypertension        MEDs:     atenolol  celecoxib  cyclobenzaprine  hydrOXYzine HCl  losartan  Naproxen Sodium Caps  sildenafil  testosterone cypionate  vitamin D Caps     ALLERGIES:    Allergies   Allergen Reactions    Tramadol

## 2025-02-24 ENCOUNTER — OFFICE VISIT (OUTPATIENT)
Dept: ONCOLOGY | Age: 60
End: 2025-02-24
Payer: COMMERCIAL

## 2025-02-24 VITALS
BODY MASS INDEX: 23.98 KG/M2 | SYSTOLIC BLOOD PRESSURE: 166 MMHG | OXYGEN SATURATION: 100 % | RESPIRATION RATE: 18 BRPM | DIASTOLIC BLOOD PRESSURE: 85 MMHG | TEMPERATURE: 97.7 F | HEIGHT: 72 IN | HEART RATE: 66 BPM | WEIGHT: 177 LBS

## 2025-02-24 DIAGNOSIS — C61 PROSTATE CANCER (HCC): Primary | ICD-10-CM

## 2025-02-24 PROCEDURE — 99215 OFFICE O/P EST HI 40 MIN: CPT | Performed by: UROLOGY

## 2025-02-24 ASSESSMENT — ENCOUNTER SYMPTOMS
RESPIRATORY NEGATIVE: 1
GASTROINTESTINAL NEGATIVE: 1

## 2025-02-24 NOTE — PATIENT INSTRUCTIONS
Patient Information from Today's Visit    The members of your Oncology Medical Home are listed below:    Physician Provider: Jad Fuentes, Urologic Oncologist  Advanced Practice Clinician: ROXI Sherman  Medical Assistant: Caron REIS CMA  :Nichelle SANDOVAL  Supportive Care Services: Michael RUIZ LMSW    Diagnosis: Prostate    Follow Up Instructions:   Pathology reviewed  IPSS and LOLA completed  We will refer you to radiation oncology.  We will plan to see you back in 3 weeks. If you need anything prior to your next appointment please do not hesitate to call our office.  Treatment Summary has been discussed and given to patient:N/A      Current Labs:   No visits with results within 3 Day(s) from this visit.   Latest known visit with results is:   Admission on 02/11/2025, Discharged on 02/11/2025   Component Date Value Ref Range Status    Color, UA 02/11/2025 YELLOW/STRAW    Final    Color Reference Range: Straw, Yellow or Dark Yellow    Appearance 02/11/2025 CLEAR    Final    Specific Gravity, UA 02/11/2025 1.019  1.001 - 1.023   Final    pH, Urine 02/11/2025 5.5  5.0 - 9.0   Final    Protein, UA 02/11/2025 Negative  NEG mg/dL Final    Glucose, Ur 02/11/2025 Negative  NEG mg/dL Final    Ketones, Urine 02/11/2025 Negative  NEG mg/dL Final    Bilirubin, Urine 02/11/2025 Negative  NEG   Final    Blood, Urine 02/11/2025 Negative  NEG   Final    Urobilinogen, Urine 02/11/2025 0.2  0.2 - 1.0 EU/dL Final    Nitrite, Urine 02/11/2025 Negative  NEG   Final    Leukocyte Esterase, Urine 02/11/2025 Negative  NEG   Final               Please refer to After Visit Summary or [employer] for upcoming appointment information. Please call our office for rescheduling needs at least 24 hours before your scheduled appointment time.If you have any questions regarding your upcoming schedule please reach out to your care team through [employer] or call [phone].     Please notify your assigned Nurse Navigator of any unplanned

## 2025-02-27 NOTE — PROGRESS NOTES
[facility] RADIATION ONCOLOGY CONSULTATION    Patient: Chung Goldsmith MRN: [id number]  SSN: [id number]    YOB: 1965  Age: 59 y.o.  Sex: male      Other Providers:  ROXI De Santiago    CHIEF COMPLAINT: Elevated PSA    DIAGNOSIS: Stage I cT1 cN0 M0 low risk prostatic adenocarcinoma, Lehigh score 3+3 = 6, initial PSA 5.3 (PSA density 0.3)    PREVIOUS RADIATION TREATMENT:  None    HISTORY OF PRESENT ILLNESS:  Chung Goldsmith is a 59 y.o. male who I am seeing at the request of ROXI De Santiago.  He was found to have an elevated PSA of 5.9 on 11/11/2024 which repeat on 12/4/2024 returned at 5.3.  He subsequently underwent prostate MRI on 12/16/2024 which demonstrated a PI-RADS 4 lesion in the right mid gland TZ which was approximately 1 cm.  His prostate volume was 17 cc.  There is no evidence of extracapsular extension or seminal vesicle invasion.  He underwent prostate biopsy on 2/11/2025 which demonstrated 8 out of a total of 13 cores positive for Geoffrey 3+3 = 6 prostatic adenocarcinoma ranging from 5 to 50% core involvement.  He discussed treatment options with Dr. Fuentes and was most interested in leading towards a period of active surveillance, however given the high volume of cancer and his prostate gland occupying more than 50% of cores taken, it was recommended that he consider some form of definitive treatment.  He presents to radiation oncology for further discussion.  He is an employee at [employer].    PSA history  12/04/2024 = 5.3  11/11/2024 = 5.9  05/13/2024 = 3.7  11/06/2023 = 4.1  09/28/2023 = 4.5  01/31/2023 = 3.9  07/14/2022 = 3.1      His AUA is 10. His last colonoscopy was 7/28/22. His pain is 0/10.    PAST MEDICAL HISTORY:    Past Medical History:   Diagnosis Date    Chronic pain 11/2014    back pain and right buttocks, leg , foot    HNP (herniated nucleus pulposus), lumbar     Hypertension        The patient denies history of

## 2025-03-06 ENCOUNTER — HOSPITAL ENCOUNTER (OUTPATIENT)
Dept: RADIATION ONCOLOGY | Age: 60
Setting detail: RECURRING SERIES
Discharge: HOME | End: 2025-03-09
Payer: COMMERCIAL

## 2025-03-06 VITALS
BODY MASS INDEX: 23.43 KG/M2 | RESPIRATION RATE: 16 BRPM | HEIGHT: 72 IN | WEIGHT: 173 LBS | DIASTOLIC BLOOD PRESSURE: 115 MMHG | TEMPERATURE: 98.1 F | SYSTOLIC BLOOD PRESSURE: 152 MMHG | HEART RATE: 59 BPM

## 2025-03-06 PROCEDURE — 99211 OFF/OP EST MAY X REQ PHY/QHP: CPT

## 2025-03-06 RX ORDER — TAMSULOSIN HYDROCHLORIDE 0.4 MG/1
0.4 CAPSULE ORAL DAILY
Qty: 30 CAPSULE | Refills: 1 | Status: SHIPPED | OUTPATIENT
Start: 2025-03-06

## 2025-03-06 ASSESSMENT — PATIENT HEALTH QUESTIONNAIRE - PHQ9
1. LITTLE INTEREST OR PLEASURE IN DOING THINGS: NOT AT ALL
SUM OF ALL RESPONSES TO PHQ QUESTIONS 1-9: 0
2. FEELING DOWN, DEPRESSED OR HOPELESS: NOT AT ALL

## 2025-03-06 NOTE — PROGRESS NOTES
Radiation Therapy Education:  Chung Goldsmith  is a 59 y.o. year old male with Prostate cancer who presents for Radiation Therapy education.     Patient arrives today accompanied by   Patient reports no previous Radiation Treatments  Patient reports no pacemaker or other chest implants  Patient reports current pain of 0/10  Colonoscopy: 2022, 1 polyp,   Patient on 3 year cycle  AUA:  10    The patient was given handouts for area of planned treatment and potential side effects, frequently asked questions, skin care, and vitamin recommendations for reference.      Self-care guidelines were distributed and discussed with the patient and included the followin) Potential long term and short term side effects of therapy including fertility risks for appropriate patients    2) Symptoms and side effects that require the patient to contact [facility] or require immediate attention    3) The [facility]'s contact information with availability and instructions on who and when to call    4) The [facility] missed appointment policy and expectations for rescheduling or canceling    Patient denies any needs or referrals at this time. No prescriptions will be sent electronically to the local pharmacy.    Patient asked appropriate questions and was involved and engaged during the educational session.  There were no barriers to learning that were observed or demonstrated during the encounter.  Preference in learning style assessed as visual, written and verbal to ensure understanding.  Patient acknowledged readiness to learn by responding appropriately to open ended questions about radiation therapy education, disease process, treatment plan and possible side effects, etc.  Patient offered feedback on learning and the educational encounter.  Patient acknowledges the importance of and agrees to adhering to the treatment plan.    Time was

## 2025-03-17 ENCOUNTER — OFFICE VISIT (OUTPATIENT)
Dept: ONCOLOGY | Age: 60
End: 2025-03-17
Payer: COMMERCIAL

## 2025-03-17 VITALS
BODY MASS INDEX: 23.77 KG/M2 | TEMPERATURE: 98 F | RESPIRATION RATE: 16 BRPM | OXYGEN SATURATION: 100 % | DIASTOLIC BLOOD PRESSURE: 87 MMHG | SYSTOLIC BLOOD PRESSURE: 143 MMHG | HEIGHT: 72 IN | HEART RATE: 57 BPM | WEIGHT: 175.5 LBS

## 2025-03-17 DIAGNOSIS — C61 PROSTATE CANCER (HCC): Primary | ICD-10-CM

## 2025-03-17 PROCEDURE — 99214 OFFICE O/P EST MOD 30 MIN: CPT | Performed by: UROLOGY

## 2025-03-17 ASSESSMENT — PATIENT HEALTH QUESTIONNAIRE - PHQ9
SUM OF ALL RESPONSES TO PHQ QUESTIONS 1-9: 0
1. LITTLE INTEREST OR PLEASURE IN DOING THINGS: NOT AT ALL
SUM OF ALL RESPONSES TO PHQ QUESTIONS 1-9: 0
2. FEELING DOWN, DEPRESSED OR HOPELESS: NOT AT ALL
SUM OF ALL RESPONSES TO PHQ QUESTIONS 1-9: 0
SUM OF ALL RESPONSES TO PHQ QUESTIONS 1-9: 0

## 2025-03-17 ASSESSMENT — ENCOUNTER SYMPTOMS
GASTROINTESTINAL NEGATIVE: 1
RESPIRATORY NEGATIVE: 1

## 2025-03-17 NOTE — PROGRESS NOTES
Urologic Oncology  [facility] Hematology & Oncology  [address] [place] [id number]  [phone]        Mr. Chung Goldsmith is a 59 y.o. male with a diagnosis of GS 3+3=6 prostate cancer diagnosed on transperineal prostate biopsy on 2/11/25.  cT1c. PSA 5.3 on 12/4/24. MRI with PI-RADS 4 right mid gland TZ.    INTERVAL HISTORY: Patient returns today for discussion of treatment options for his newly diagnosed Geoffrey 3+3 prostate cancer and 8 of 13 cores.  He met with Dr. Gilliland and radiation oncology and is elected to move forward with SBRT.  He comes back today for placement of fiducial markers.  He has decided he does not want to move forward with robotic radical prostatectomy.  He also has decided he is not comfortable with active surveillance.      From previous note (2/24/25):  Patient is here today in follow-up after his transperineal prostate biopsy on 2/11/2025.  He was found to have 8 out of 13 cores of Geoffrey 3+3 equal 6.  These ranged from 5% involvement up to 50% core involvement.     His PSA on 12/4/2024 was 5.3.  His gland measured 22 cc.  JORDAN was negative, clinical stage T1c.     IPSS is 11 with a quality-of-life score of 2.  His kain today is 20.     He works at [employer] and is a relatively new employee.     He recovered from the biopsy well and had no known complications.    Past medical, family and social histories, as well as medications and allergies, were reviewed and updated in the medical record as appropriate.    PMH:     Past Medical History:   Diagnosis Date    Chronic pain 11/2014    back pain and right buttocks, leg , foot    HNP (herniated nucleus pulposus), lumbar     Hypertension        MEDs:     atenolol  celecoxib  cyclobenzaprine  hydrOXYzine HCl  losartan  Naproxen Sodium Caps  sildenafil  tamsulosin  testosterone cypionate  vitamin D Caps     ALLERGIES:    Allergies   Allergen Reactions    Tramadol Other (See Comments)     Patient states not allergic    
na  
typographical errors.      Chin Fuentes MD  Urologic Oncology  [facility]

## 2025-03-17 NOTE — PATIENT INSTRUCTIONS
Patient Information from Today's Visit    The members of your Oncology Medical Home are listed below:    Physician Provider: Jad Fuentes, Urologic Oncology Surgeon  Physician Assistant: Kareen Appiah PA  Navigator: N/A  Medical Assistant: Caron REIS MA + Chery NICOLE MA  : Nichelle SANDOVAL   Supportive Care Services: Michael RUIZ LMSW    Diagnosis: prostate cancer      Follow Up Instructions:   - plan for fiducial marker placement  - follow up in 6 months with labs a few days prior         Current Labs:         Please refer to After Visit Summary or [employer] for upcoming appointment information. Please call our office for rescheduling needs at least 24 hours before your scheduled appointment time.If you have any questions regarding your upcoming schedule please reach out to your care team through [employer] or call [phone].     Please notify your assigned Nurse Navigator of any unplanned hospital admissions or Emergency Room visits within 24 hours of discharge.    -------------------------------------------------------------------------------------------------------------------  Please call our office at [phone] if you have any  of the following symptoms:   Fever of 100.5 or greater  Chills  Shortness of breath  Swelling or pain in one leg    After office hours an answering service is available and will contact a provider for emergencies or if you are experiencing any of the above symptoms.        Kimberly Mcpherson, RN

## 2025-03-25 ENCOUNTER — PREP FOR PROCEDURE (OUTPATIENT)
Dept: ONCOLOGY | Age: 60
End: 2025-03-25

## 2025-04-03 DIAGNOSIS — C61 PROSTATE CANCER (HCC): Primary | ICD-10-CM

## 2025-04-12 DIAGNOSIS — R53.83 OTHER FATIGUE: ICD-10-CM

## 2025-04-12 DIAGNOSIS — I10 ESSENTIAL HYPERTENSION: ICD-10-CM

## 2025-04-12 DIAGNOSIS — E78.00 PURE HYPERCHOLESTEROLEMIA: ICD-10-CM

## 2025-04-12 DIAGNOSIS — E29.1 HYPOGONADISM IN MALE: Primary | ICD-10-CM

## 2025-04-12 DIAGNOSIS — E55.9 VITAMIN D DEFICIENCY: ICD-10-CM

## 2025-05-09 ENCOUNTER — CLINICAL DOCUMENTATION (OUTPATIENT)
Age: 60
End: 2025-05-09

## 2025-05-09 NOTE — PROGRESS NOTES
Called patient in regards to cancellation of fiducial marker placement. Patient states he started a new job and he's waiting on insurance to start. I advised the patient to call the office back when he is ready to move forward. Patient verbalized understanding

## 2025-07-10 DIAGNOSIS — E29.1 HYPOGONADISM IN MALE: ICD-10-CM

## 2025-07-11 RX ORDER — TESTOSTERONE CYPIONATE 200 MG/ML
INJECTION, SOLUTION INTRAMUSCULAR
Qty: 10 ML | Refills: 0 | Status: SHIPPED | OUTPATIENT
Start: 2025-07-11 | End: 2025-10-09

## 2025-08-28 ENCOUNTER — OFFICE VISIT (OUTPATIENT)
Dept: FAMILY MEDICINE CLINIC | Facility: CLINIC | Age: 60
End: 2025-08-28
Payer: COMMERCIAL

## 2025-08-28 VITALS
SYSTOLIC BLOOD PRESSURE: 138 MMHG | DIASTOLIC BLOOD PRESSURE: 86 MMHG | HEIGHT: 72 IN | OXYGEN SATURATION: 99 % | WEIGHT: 190.8 LBS | HEART RATE: 62 BPM | BODY MASS INDEX: 25.84 KG/M2

## 2025-08-28 DIAGNOSIS — Z00.00 LABORATORY EXAMINATION ORDERED AS PART OF A COMPLETE PHYSICAL EXAMINATION: ICD-10-CM

## 2025-08-28 DIAGNOSIS — E29.1 HYPOGONADISM IN MALE: ICD-10-CM

## 2025-08-28 DIAGNOSIS — E78.00 PURE HYPERCHOLESTEROLEMIA: ICD-10-CM

## 2025-08-28 DIAGNOSIS — C61 PROSTATE CANCER (HCC): Primary | ICD-10-CM

## 2025-08-28 DIAGNOSIS — E55.9 VITAMIN D DEFICIENCY: ICD-10-CM

## 2025-08-28 DIAGNOSIS — Z00.00 HEALTHCARE MAINTENANCE: ICD-10-CM

## 2025-08-28 DIAGNOSIS — Z13.1 SCREENING FOR DIABETES MELLITUS: ICD-10-CM

## 2025-08-28 DIAGNOSIS — I10 ESSENTIAL (PRIMARY) HYPERTENSION: ICD-10-CM

## 2025-08-28 LAB
25(OH)D3 SERPL-MCNC: 24.9 NG/ML (ref 30–100)
ALBUMIN SERPL-MCNC: 3.9 G/DL (ref 3.2–4.6)
ALBUMIN/GLOB SERPL: 1.2 (ref 1–1.9)
ALP SERPL-CCNC: 69 U/L (ref 40–129)
ALT SERPL-CCNC: 51 U/L (ref 8–55)
ANION GAP SERPL CALC-SCNC: 8 MMOL/L (ref 7–16)
AST SERPL-CCNC: 30 U/L (ref 15–37)
BASOPHILS # BLD: 0.02 K/UL (ref 0–0.2)
BASOPHILS NFR BLD: 0.3 % (ref 0–2)
BILIRUB SERPL-MCNC: 0.4 MG/DL (ref 0–1.2)
BILIRUBIN, URINE, POC: NEGATIVE
BLOOD URINE, POC: NEGATIVE
BUN SERPL-MCNC: 18 MG/DL (ref 8–23)
CALCIUM SERPL-MCNC: 9.4 MG/DL (ref 8.8–10.2)
CHLORIDE SERPL-SCNC: 106 MMOL/L (ref 98–107)
CHOLEST SERPL-MCNC: 212 MG/DL (ref 0–200)
CO2 SERPL-SCNC: 29 MMOL/L (ref 20–29)
CREAT SERPL-MCNC: 1.08 MG/DL (ref 0.8–1.3)
DIFFERENTIAL METHOD BLD: NORMAL
EOSINOPHIL # BLD: 0.05 K/UL (ref 0–0.8)
EOSINOPHIL NFR BLD: 0.7 % (ref 0.5–7.8)
ERYTHROCYTE [DISTWIDTH] IN BLOOD BY AUTOMATED COUNT: 13.2 % (ref 11.9–14.6)
EST. AVERAGE GLUCOSE BLD GHB EST-MCNC: 113 MG/DL
GLOBULIN SER CALC-MCNC: 3.3 G/DL (ref 2.3–3.5)
GLUCOSE SERPL-MCNC: 84 MG/DL (ref 70–99)
GLUCOSE URINE, POC: NEGATIVE
HBA1C MFR BLD: 5.6 % (ref 0–5.6)
HCT VFR BLD AUTO: 45.6 % (ref 41.1–50.3)
HDLC SERPL-MCNC: 60 MG/DL (ref 40–60)
HDLC SERPL: 3.6 (ref 0–5)
HGB BLD-MCNC: 14.9 G/DL (ref 13.6–17.2)
IMM GRANULOCYTES # BLD AUTO: 0.04 K/UL (ref 0–0.5)
IMM GRANULOCYTES NFR BLD AUTO: 0.6 % (ref 0–5)
KETONES, URINE, POC: NEGATIVE
LDLC SERPL CALC-MCNC: 139 MG/DL (ref 0–100)
LEUKOCYTE ESTERASE, URINE, POC: NEGATIVE
LYMPHOCYTES # BLD: 2.21 K/UL (ref 0.5–4.6)
LYMPHOCYTES NFR BLD: 31 % (ref 13–44)
MCH RBC QN AUTO: 31.5 PG (ref 26.1–32.9)
MCHC RBC AUTO-ENTMCNC: 32.7 G/DL (ref 31.4–35)
MCV RBC AUTO: 96.4 FL (ref 82–102)
MONOCYTES # BLD: 0.75 K/UL (ref 0.1–1.3)
MONOCYTES NFR BLD: 10.5 % (ref 4–12)
NEUTS SEG # BLD: 4.06 K/UL (ref 1.7–8.2)
NEUTS SEG NFR BLD: 56.9 % (ref 43–78)
NITRITE, URINE, POC: NEGATIVE
NRBC # BLD: 0 K/UL (ref 0–0.2)
PH, URINE, POC: 7 (ref 4.6–8)
PLATELET # BLD AUTO: 180 K/UL (ref 150–450)
PMV BLD AUTO: 10.4 FL (ref 9.4–12.3)
POTASSIUM SERPL-SCNC: 4.5 MMOL/L (ref 3.5–5.1)
PROT SERPL-MCNC: 7.3 G/DL (ref 6.3–8.2)
PROTEIN,URINE, POC: NEGATIVE
PSA SERPL-MCNC: 4 NG/ML (ref 0–4)
RBC # BLD AUTO: 4.73 M/UL (ref 4.23–5.6)
SODIUM SERPL-SCNC: 143 MMOL/L (ref 136–145)
SPECIFIC GRAVITY, URINE, POC: 1.02 (ref 1–1.03)
TRIGL SERPL-MCNC: 65 MG/DL (ref 0–150)
TSH W FREE THYROID IF ABNORMAL: 1.88 UIU/ML (ref 0.27–4.2)
URINALYSIS CLARITY, POC: CLEAR
URINALYSIS COLOR, POC: YELLOW
UROBILINOGEN, POC: NORMAL
VLDLC SERPL CALC-MCNC: 13 MG/DL (ref 6–23)
WBC # BLD AUTO: 7.1 K/UL (ref 4.3–11.1)

## 2025-08-28 PROCEDURE — 3075F SYST BP GE 130 - 139MM HG: CPT | Performed by: NURSE PRACTITIONER

## 2025-08-28 PROCEDURE — 3079F DIAST BP 80-89 MM HG: CPT | Performed by: NURSE PRACTITIONER

## 2025-08-28 PROCEDURE — 90471 IMMUNIZATION ADMIN: CPT | Performed by: NURSE PRACTITIONER

## 2025-08-28 PROCEDURE — 81003 URINALYSIS AUTO W/O SCOPE: CPT | Performed by: NURSE PRACTITIONER

## 2025-08-28 PROCEDURE — 99214 OFFICE O/P EST MOD 30 MIN: CPT | Performed by: NURSE PRACTITIONER

## 2025-08-28 PROCEDURE — 90677 PCV20 VACCINE IM: CPT | Performed by: NURSE PRACTITIONER

## 2025-08-28 RX ORDER — ATENOLOL 50 MG/1
50 TABLET ORAL DAILY
Qty: 90 TABLET | Refills: 3 | Status: SHIPPED | OUTPATIENT
Start: 2025-08-28

## 2025-08-28 RX ORDER — ERGOCALCIFEROL 1.25 MG/1
CAPSULE, LIQUID FILLED ORAL
Qty: 8 CAPSULE | Refills: 3 | Status: SHIPPED | OUTPATIENT
Start: 2025-08-28 | End: 2025-08-29 | Stop reason: SDUPTHER

## 2025-08-28 RX ORDER — MELOXICAM 15 MG/1
TABLET ORAL
COMMUNITY
Start: 2025-08-20

## 2025-08-28 RX ORDER — TESTOSTERONE CYPIONATE 200 MG/ML
INJECTION, SOLUTION INTRAMUSCULAR
Qty: 10 ML | Refills: 0 | Status: CANCELLED | OUTPATIENT
Start: 2025-08-28 | End: 2025-11-26

## 2025-08-28 ASSESSMENT — ENCOUNTER SYMPTOMS
SINUS PAIN: 0
ALLERGIC/IMMUNOLOGIC NEGATIVE: 1
ABDOMINAL DISTENTION: 0
RESPIRATORY NEGATIVE: 1
EYE DISCHARGE: 0
SORE THROAT: 0
RHINORRHEA: 0
NAUSEA: 0
VOMITING: 0
CONSTIPATION: 0
ANAL BLEEDING: 0
STRIDOR: 0
APNEA: 0
EYE PAIN: 0
CHEST TIGHTNESS: 0
SINUS PRESSURE: 0
BACK PAIN: 0
FACIAL SWELLING: 0
GASTROINTESTINAL NEGATIVE: 1
COUGH: 0
EYE ITCHING: 0
BLOOD IN STOOL: 0
PHOTOPHOBIA: 0
ABDOMINAL PAIN: 0
COLOR CHANGE: 0
WHEEZING: 0
CHOKING: 0
TROUBLE SWALLOWING: 0
EYES NEGATIVE: 1
SHORTNESS OF BREATH: 0
DIARRHEA: 0
EYE REDNESS: 0
RECTAL PAIN: 0
VOICE CHANGE: 0

## 2025-08-29 LAB — TESTOST SERPL-MCNC: >1500 NG/DL (ref 264–916)

## 2025-09-04 LAB
TESTOST FREE SERPL-MCNC: 36.3 PG/ML (ref 6.6–18.1)
TESTOST SERPL-MCNC: >1500 NG/DL (ref 264–916)

## (undated) DEVICE — JELLY LUBRICATING 10GM PREFIL SYR LUBE

## (undated) DEVICE — NEEDLE SYR 18GA L1.5IN RED PLAS HUB S STL BLNT FILL W/O

## (undated) DEVICE — KENDALL RADIOLUCENT FOAM MONITORING ELECTRODE RECTANGULAR SHAPE: Brand: KENDALL

## (undated) DEVICE — SKIN MARKER,REGULAR TIP WITH RULER: Brand: DEVON

## (undated) DEVICE — LUBE JELLY FOIL PACK 1.4 OZ: Brand: MEDLINE INDUSTRIES, INC.

## (undated) DEVICE — AIRLIFE™ OXYGEN TUBING 7 FEET (2.1 M) CRUSH RESISTANT OXYGEN TUBING, VINYL TIPPED: Brand: AIRLIFE™

## (undated) DEVICE — COVER PRB W1XL11.8IN ENDOCAVITY CLR E BND NEOGUARD

## (undated) DEVICE — MAX-CORE® DISPOSABLE CORE BIOPSY INSTRUMENT, 18G X 25CM: Brand: MAX-CORE

## (undated) DEVICE — NEEDLE HYPO 21GA L1.25IN GRN POLYPR HUB S STL REG BVL STR

## (undated) DEVICE — STERILE (2.0 X 30CM) COVER: Brand: NEOGUARD™ TRANSDUCER COVER

## (undated) DEVICE — DRAPE TWL SURG 16X26IN BLU ORB04] ALLCARE INC]

## (undated) DEVICE — SINGLE PORT MANIFOLD: Brand: NEPTUNE 2

## (undated) DEVICE — CANNULA NSL ORAL AD FOR CAPNOFLEX CO2 O2 AIRLFE

## (undated) DEVICE — NON-STERILE (4 X 30CM) COVER: Brand: NEOGUARD™

## (undated) DEVICE — SYRINGE MED 10ML LUERLOCK TIP W/O SFTY DISP

## (undated) DEVICE — NEEDLE SYRINGE 18GA L1.5IN RED PLAS HUB S STL BLNT FILL W/O

## (undated) DEVICE — STERILE PACKED. BORE DIAMETER 1.6 MM; ANGLE OF INSERTION 19° TO THE LONG AXIS OF THE TRANSDUCER: Brand: SINGLE-USE BIPLANE BIOPSY GUIDE

## (undated) DEVICE — GEL US 20GM NONIRRITATING OVERWRAPPED FILE PCH TRNSMIT

## (undated) DEVICE — CONNECTOR TBNG OD5-7MM O2 END DISP

## (undated) DEVICE — HOLDER PRB URONAV

## (undated) DEVICE — SYRINGE MED 3ML CLR PLAS STD N CTRL LUERLOCK TIP DISP

## (undated) DEVICE — GAUZE,SPONGE,4"X4",12PLY,WOVEN,NS,LF: Brand: MEDLINE

## (undated) DEVICE — KIT BX PROST 20ML VI PREFIL W 10ML 10% NEUT BUFF FRMLN LEAK

## (undated) DEVICE — THE TORRENT IRRIGATION TUBING IS INTENDED TO PROVIDE IRRIGATION VIA IRRIGATION FLUIDS, SUCH AS STERILE WATER, DURING GASTROINTESTINAL ENDOSCOPIC PROCEDURES WHEN USED IN CONJUNCTION WITH AN IRRIGATION PUMP OR ELECTROSURGICAL UNIT.: Brand: TORRENT

## (undated) DEVICE — APPLICATOR MEDICATED 26 CC SOLUTION HI LT ORNG CHLORAPREP

## (undated) DEVICE — GLOVE SURG SZ 75 L12IN FNGR THK79MIL GRN LTX FREE